# Patient Record
Sex: FEMALE | Race: WHITE | Employment: OTHER | URBAN - METROPOLITAN AREA
[De-identification: names, ages, dates, MRNs, and addresses within clinical notes are randomized per-mention and may not be internally consistent; named-entity substitution may affect disease eponyms.]

---

## 2017-02-09 ENCOUNTER — NURSE ONLY (OUTPATIENT)
Dept: CARDIAC REHAB | Facility: HOSPITAL | Age: 67
End: 2017-02-09
Attending: INTERNAL MEDICINE

## 2017-05-02 ENCOUNTER — PRIOR ORIGINAL RECORDS (OUTPATIENT)
Dept: OTHER | Age: 67
End: 2017-05-02

## 2017-06-27 PROBLEM — H40.1233 LOW-TENSION GLAUCOMA OF BOTH EYES, SEVERE STAGE: Status: ACTIVE | Noted: 2017-06-27

## 2017-06-27 PROCEDURE — 86706 HEP B SURFACE ANTIBODY: CPT | Performed by: INTERNAL MEDICINE

## 2017-06-27 PROCEDURE — 82570 ASSAY OF URINE CREATININE: CPT | Performed by: INTERNAL MEDICINE

## 2017-06-27 PROCEDURE — 81003 URINALYSIS AUTO W/O SCOPE: CPT | Performed by: INTERNAL MEDICINE

## 2017-06-27 PROCEDURE — 82043 UR ALBUMIN QUANTITATIVE: CPT | Performed by: INTERNAL MEDICINE

## 2017-11-10 PROCEDURE — 87086 URINE CULTURE/COLONY COUNT: CPT | Performed by: INTERNAL MEDICINE

## 2017-11-28 PROCEDURE — 82607 VITAMIN B-12: CPT | Performed by: INTERNAL MEDICINE

## 2017-11-28 PROCEDURE — 36415 COLL VENOUS BLD VENIPUNCTURE: CPT | Performed by: INTERNAL MEDICINE

## 2017-12-08 PROBLEM — K30 NUD (NONULCER DYSPEPSIA): Status: ACTIVE | Noted: 2017-12-08

## 2018-01-11 ENCOUNTER — HOSPITAL ENCOUNTER (EMERGENCY)
Facility: HOSPITAL | Age: 68
Discharge: HOME | End: 2018-01-11
Attending: EMERGENCY MEDICINE
Payer: MEDICARE

## 2018-01-11 ENCOUNTER — APPOINTMENT (OUTPATIENT)
Dept: GENERAL RADIOLOGY | Facility: HOSPITAL | Age: 68
End: 2018-01-11
Payer: MEDICARE

## 2018-01-11 VITALS
HEART RATE: 85 BPM | SYSTOLIC BLOOD PRESSURE: 137 MMHG | OXYGEN SATURATION: 98 % | TEMPERATURE: 98 F | WEIGHT: 114 LBS | RESPIRATION RATE: 16 BRPM | HEIGHT: 59 IN | BODY MASS INDEX: 22.98 KG/M2 | DIASTOLIC BLOOD PRESSURE: 71 MMHG

## 2018-01-11 DIAGNOSIS — K21.9 GASTROESOPHAGEAL REFLUX DISEASE, ESOPHAGITIS PRESENCE NOT SPECIFIED: ICD-10-CM

## 2018-01-11 DIAGNOSIS — R07.9 ACUTE CHEST PAIN: Primary | ICD-10-CM

## 2018-01-11 LAB
ALBUMIN SERPL-MCNC: 3.8 G/DL (ref 3.5–4.8)
ALP LIVER SERPL-CCNC: 58 U/L (ref 55–142)
ALT SERPL-CCNC: 29 U/L (ref 14–54)
AST SERPL-CCNC: 30 U/L (ref 15–41)
BASOPHILS # BLD AUTO: 0.04 X10(3) UL (ref 0–0.1)
BASOPHILS NFR BLD AUTO: 0.6 %
BILIRUB SERPL-MCNC: 0.5 MG/DL (ref 0.1–2)
BUN BLD-MCNC: 16 MG/DL (ref 8–20)
CALCIUM BLD-MCNC: 9.6 MG/DL (ref 8.3–10.3)
CHLORIDE: 104 MMOL/L (ref 101–111)
CO2: 27 MMOL/L (ref 22–32)
CREAT BLD-MCNC: 0.76 MG/DL (ref 0.55–1.02)
EOSINOPHIL # BLD AUTO: 0.07 X10(3) UL (ref 0–0.3)
EOSINOPHIL NFR BLD AUTO: 1.1 %
ERYTHROCYTE [DISTWIDTH] IN BLOOD BY AUTOMATED COUNT: 13.2 % (ref 11.5–16)
GLUCOSE BLD-MCNC: 126 MG/DL (ref 70–99)
HCT VFR BLD AUTO: 41.2 % (ref 34–50)
HGB BLD-MCNC: 13.9 G/DL (ref 12–16)
IMMATURE GRANULOCYTE COUNT: 0.01 X10(3) UL (ref 0–1)
IMMATURE GRANULOCYTE RATIO %: 0.2 %
LYMPHOCYTES # BLD AUTO: 2.67 X10(3) UL (ref 0.9–4)
LYMPHOCYTES NFR BLD AUTO: 42.4 %
M PROTEIN MFR SERPL ELPH: 7.5 G/DL (ref 6.1–8.3)
MCH RBC QN AUTO: 28.3 PG (ref 27–33.2)
MCHC RBC AUTO-ENTMCNC: 33.7 G/DL (ref 31–37)
MCV RBC AUTO: 83.7 FL (ref 81–100)
MONOCYTES # BLD AUTO: 0.5 X10(3) UL (ref 0.1–0.6)
MONOCYTES NFR BLD AUTO: 7.9 %
NEUTROPHIL ABS PRELIM: 3 X10 (3) UL (ref 1.3–6.7)
NEUTROPHILS # BLD AUTO: 3 X10(3) UL (ref 1.3–6.7)
NEUTROPHILS NFR BLD AUTO: 47.8 %
PLATELET # BLD AUTO: 258 10(3)UL (ref 150–450)
POTASSIUM SERPL-SCNC: 4 MMOL/L (ref 3.6–5.1)
RBC # BLD AUTO: 4.92 X10(6)UL (ref 3.8–5.1)
RED CELL DISTRIBUTION WIDTH-SD: 40.9 FL (ref 35.1–46.3)
SODIUM SERPL-SCNC: 138 MMOL/L (ref 136–144)
TROPONIN: <0.046 NG/ML (ref ?–0.05)
WBC # BLD AUTO: 6.3 X10(3) UL (ref 4–13)

## 2018-01-11 PROCEDURE — 84484 ASSAY OF TROPONIN QUANT: CPT | Performed by: EMERGENCY MEDICINE

## 2018-01-11 PROCEDURE — 93010 ELECTROCARDIOGRAM REPORT: CPT

## 2018-01-11 PROCEDURE — 85025 COMPLETE CBC W/AUTO DIFF WBC: CPT | Performed by: EMERGENCY MEDICINE

## 2018-01-11 PROCEDURE — 99285 EMERGENCY DEPT VISIT HI MDM: CPT

## 2018-01-11 PROCEDURE — 80053 COMPREHEN METABOLIC PANEL: CPT | Performed by: EMERGENCY MEDICINE

## 2018-01-11 PROCEDURE — 80053 COMPREHEN METABOLIC PANEL: CPT

## 2018-01-11 PROCEDURE — 85025 COMPLETE CBC W/AUTO DIFF WBC: CPT

## 2018-01-11 PROCEDURE — 84484 ASSAY OF TROPONIN QUANT: CPT

## 2018-01-11 PROCEDURE — 71045 X-RAY EXAM CHEST 1 VIEW: CPT

## 2018-01-11 PROCEDURE — 36415 COLL VENOUS BLD VENIPUNCTURE: CPT

## 2018-01-11 PROCEDURE — 93005 ELECTROCARDIOGRAM TRACING: CPT

## 2018-01-11 NOTE — ED INITIAL ASSESSMENT (HPI)
Patient reports midsternal chest pain this am which then progressed to left sided chest pain as well as pain between shoulder blades. Reports left side pain feels sharp, midsternal feels more like GERD and back is aching. Denies any cough/congestion/sob.  D

## 2018-01-12 LAB
ATRIAL RATE: 85 BPM
P AXIS: 77 DEGREES
P-R INTERVAL: 132 MS
Q-T INTERVAL: 384 MS
QRS DURATION: 80 MS
QTC CALCULATION (BEZET): 456 MS
R AXIS: 0 DEGREES
T AXIS: 41 DEGREES
VENTRICULAR RATE: 85 BPM

## 2018-01-12 NOTE — ED PROVIDER NOTES
Patient Seen in: [facility] Emergency Department    History   Patient presents with:  Chest Pain Angina (cardiovascular)    Stated Complaint: chest pain    HPI    This is a 47-year-old female with past medical history of asthma, vertigo, hyperlipide adenoma  1/12/10: COLONOSCOPY,DIAGNOSTIC      Comment: rosi, hemorrhoids  2009: D & C      Comment: post men bleeding  3/19-3/21/13: G-ESOPH REFLX TST W/ELECTROD      Comment: 46 Bravo on Dexilant/Zantac shows 0 reflux in                a 48 hour period  No extremity edema. No calf pain or tenderness.       ED Course     Labs Reviewed   COMP METABOLIC PANEL (14) - Abnormal; Notable for the following:        Result Value    Glucose 126 (*)     All other components within normal limits   TROPONIN I - Normal   C established of normal saline. Patient took 300 mg of Zantac which she was due for this evening. Chest x-ray was negative. EKG showed a sinus with no acute changes. Basic labs were obtained. CBC, CMP, troponin were all negative.   Low suspicion for card

## 2018-01-26 ENCOUNTER — PRIOR ORIGINAL RECORDS (OUTPATIENT)
Dept: OTHER | Age: 68
End: 2018-01-26

## 2018-02-01 PROCEDURE — 88305 TISSUE EXAM BY PATHOLOGIST: CPT | Performed by: INTERNAL MEDICINE

## 2018-04-18 ENCOUNTER — HOSPITAL ENCOUNTER (OUTPATIENT)
Dept: GENERAL RADIOLOGY | Facility: HOSPITAL | Age: 68
Discharge: HOME | End: 2018-04-18
Attending: INTERNAL MEDICINE
Payer: MEDICARE

## 2018-04-18 DIAGNOSIS — R13.10 DIFFICULTY IN SWALLOWING: ICD-10-CM

## 2018-04-18 PROCEDURE — 74220 X-RAY XM ESOPHAGUS 1CNTRST: CPT | Performed by: INTERNAL MEDICINE

## 2018-05-01 ENCOUNTER — PRIOR ORIGINAL RECORDS (OUTPATIENT)
Dept: OTHER | Age: 68
End: 2018-05-01

## 2018-05-01 ENCOUNTER — MYAURORA ACCOUNT LINK (OUTPATIENT)
Dept: OTHER | Age: 68
End: 2018-05-01

## 2018-05-18 ENCOUNTER — HOSPITAL ENCOUNTER (OUTPATIENT)
Dept: CV DIAGNOSTICS | Facility: HOSPITAL | Age: 68
Discharge: HOME | End: 2018-05-18
Attending: INTERNAL MEDICINE
Payer: MEDICARE

## 2018-05-18 DIAGNOSIS — R06.00 DYSPNEA: ICD-10-CM

## 2018-05-18 PROCEDURE — 93018 CV STRESS TEST I&R ONLY: CPT | Performed by: INTERNAL MEDICINE

## 2018-05-18 PROCEDURE — 93350 STRESS TTE ONLY: CPT | Performed by: INTERNAL MEDICINE

## 2018-05-18 PROCEDURE — 93017 CV STRESS TEST TRACING ONLY: CPT | Performed by: INTERNAL MEDICINE

## 2018-05-23 ENCOUNTER — PRIOR ORIGINAL RECORDS (OUTPATIENT)
Dept: OTHER | Age: 68
End: 2018-05-23

## 2018-06-12 PROCEDURE — 81001 URINALYSIS AUTO W/SCOPE: CPT | Performed by: INTERNAL MEDICINE

## 2019-01-08 ENCOUNTER — CARDPULM VISIT (OUTPATIENT)
Dept: CARDIAC REHAB | Facility: HOSPITAL | Age: 69
End: 2019-01-08
Attending: INTERNAL MEDICINE

## 2019-02-28 VITALS
WEIGHT: 109 LBS | HEIGHT: 58 IN | DIASTOLIC BLOOD PRESSURE: 62 MMHG | SYSTOLIC BLOOD PRESSURE: 110 MMHG | HEART RATE: 69 BPM | BODY MASS INDEX: 22.88 KG/M2

## 2019-03-01 VITALS
BODY MASS INDEX: 23.72 KG/M2 | HEART RATE: 64 BPM | WEIGHT: 113 LBS | DIASTOLIC BLOOD PRESSURE: 70 MMHG | HEIGHT: 58 IN | SYSTOLIC BLOOD PRESSURE: 118 MMHG

## 2019-04-02 RX ORDER — RANITIDINE 300 MG/1
TABLET ORAL
COMMUNITY
Start: 2009-11-24 | End: 2020-06-23 | Stop reason: CLARIF

## 2019-04-02 RX ORDER — ATORVASTATIN CALCIUM 10 MG/1
TABLET, FILM COATED ORAL
COMMUNITY
Start: 2009-06-04 | End: 2019-06-18 | Stop reason: SDUPTHER

## 2019-04-02 RX ORDER — LATANOPROST 50 UG/ML
SOLUTION/ DROPS OPHTHALMIC
COMMUNITY

## 2019-05-07 PROBLEM — J45.21 MILD INTERMITTENT ASTHMA WITH ACUTE EXACERBATION (HCC): Status: ACTIVE | Noted: 2019-05-07

## 2019-05-07 PROBLEM — J45.21 MILD INTERMITTENT ASTHMA WITH ACUTE EXACERBATION: Status: ACTIVE | Noted: 2019-05-07

## 2019-05-24 PROCEDURE — 87088 URINE BACTERIA CULTURE: CPT | Performed by: INTERNAL MEDICINE

## 2019-05-24 PROCEDURE — 87186 SC STD MICRODIL/AGAR DIL: CPT | Performed by: INTERNAL MEDICINE

## 2019-05-24 PROCEDURE — 87086 URINE CULTURE/COLONY COUNT: CPT | Performed by: INTERNAL MEDICINE

## 2019-06-18 ENCOUNTER — OFFICE VISIT (OUTPATIENT)
Dept: CARDIOLOGY | Age: 69
End: 2019-06-18

## 2019-06-18 VITALS
HEIGHT: 59 IN | BODY MASS INDEX: 21.57 KG/M2 | DIASTOLIC BLOOD PRESSURE: 50 MMHG | HEART RATE: 64 BPM | SYSTOLIC BLOOD PRESSURE: 90 MMHG | WEIGHT: 107 LBS

## 2019-06-18 DIAGNOSIS — R00.2 PALPITATIONS: Primary | ICD-10-CM

## 2019-06-18 DIAGNOSIS — E78.00 PURE HYPERCHOLESTEROLEMIA: ICD-10-CM

## 2019-06-18 PROCEDURE — 81003 URINALYSIS AUTO W/O SCOPE: CPT | Performed by: INTERNAL MEDICINE

## 2019-06-18 PROCEDURE — 99214 OFFICE O/P EST MOD 30 MIN: CPT | Performed by: INTERNAL MEDICINE

## 2019-06-18 RX ORDER — ATORVASTATIN CALCIUM 10 MG/1
10 TABLET, FILM COATED ORAL DAILY
Qty: 90 TABLET | Refills: 3 | Status: SHIPPED | OUTPATIENT
Start: 2019-06-18 | End: 2020-07-01

## 2019-06-18 RX ORDER — FLUTICASONE PROPIONATE 50 MCG
SPRAY, SUSPENSION (ML) NASAL
COMMUNITY

## 2019-07-03 PROCEDURE — 81003 URINALYSIS AUTO W/O SCOPE: CPT | Performed by: INTERNAL MEDICINE

## 2019-11-30 ENCOUNTER — APPOINTMENT (OUTPATIENT)
Dept: MRI IMAGING | Facility: HOSPITAL | Age: 69
End: 2019-11-30
Attending: EMERGENCY MEDICINE
Payer: MEDICARE

## 2019-11-30 ENCOUNTER — HOSPITAL ENCOUNTER (EMERGENCY)
Facility: HOSPITAL | Age: 69
Discharge: HOME | End: 2019-12-01
Attending: EMERGENCY MEDICINE
Payer: MEDICARE

## 2019-11-30 DIAGNOSIS — R47.89 WORD FINDING DIFFICULTY: Primary | ICD-10-CM

## 2019-11-30 PROCEDURE — 70546 MR ANGIOGRAPH HEAD W/O&W/DYE: CPT | Performed by: EMERGENCY MEDICINE

## 2019-11-30 PROCEDURE — 85025 COMPLETE CBC W/AUTO DIFF WBC: CPT | Performed by: EMERGENCY MEDICINE

## 2019-11-30 PROCEDURE — 99284 EMERGENCY DEPT VISIT MOD MDM: CPT

## 2019-11-30 PROCEDURE — 80053 COMPREHEN METABOLIC PANEL: CPT | Performed by: EMERGENCY MEDICINE

## 2019-11-30 PROCEDURE — 70549 MR ANGIOGRAPH NECK W/O&W/DYE: CPT | Performed by: EMERGENCY MEDICINE

## 2019-11-30 PROCEDURE — 96374 THER/PROPH/DIAG INJ IV PUSH: CPT

## 2019-11-30 PROCEDURE — 70553 MRI BRAIN STEM W/O & W/DYE: CPT | Performed by: EMERGENCY MEDICINE

## 2019-11-30 PROCEDURE — 99285 EMERGENCY DEPT VISIT HI MDM: CPT

## 2019-11-30 RX ORDER — LORAZEPAM 2 MG/ML
0.5 INJECTION INTRAMUSCULAR ONCE
Status: COMPLETED | OUTPATIENT
Start: 2019-11-30 | End: 2019-11-30

## 2019-12-01 VITALS
RESPIRATION RATE: 17 BRPM | HEIGHT: 59 IN | WEIGHT: 105 LBS | SYSTOLIC BLOOD PRESSURE: 125 MMHG | OXYGEN SATURATION: 95 % | HEART RATE: 80 BPM | DIASTOLIC BLOOD PRESSURE: 75 MMHG | BODY MASS INDEX: 21.17 KG/M2

## 2019-12-01 PROCEDURE — A9575 INJ GADOTERATE MEGLUMI 0.1ML: HCPCS | Performed by: EMERGENCY MEDICINE

## 2019-12-01 NOTE — ED PROVIDER NOTES
Patient Seen in: [facility] Emergency Department      History   Patient presents with:  Headache (neurologic)    Stated Complaint: AMS, no resolved    HPI    64-year-old female who presents here to the emergency department with her  after it W/ELECTROD  3/19-3/21/13    46 Robledo on Dexilant/Zantac shows 0 reflux in a 48 hour period   • HEMORRHOIDECTOMY     • UPPER GI ENDOSCOPY,BIOPSY  6/5/06    wnl, Gastric and SB Bx wnl   • UPPER GI ENDOSCOPY,BIOPSY  1993    gastritis, bx for H. pylori negtive breathing. Cardiac: Regular rate and rhythm. Normal S1 and 2 without murmurs or ectopy appreciated  Abdomen: Soft on examination without tenderness to deep palpation or to percussion. No masses appreciated. Bowel sounds are normoactive.   No CVA tendern will be discharged but she does not appear to have an acute neurologic insult causing symptomology. Patient will be discharged. Suggested that she take an aspirin a day.           Disposition and Plan     Clinical Impression:  Word finding difficulty  (pr

## 2019-12-01 NOTE — ED INITIAL ASSESSMENT (HPI)
Pt presents with instance of not being able to get words out to .  states this lasted approx 10 minutes.

## 2019-12-26 ENCOUNTER — OFFICE VISIT (OUTPATIENT)
Dept: NEUROLOGY | Facility: CLINIC | Age: 69
End: 2019-12-26
Payer: MEDICARE

## 2019-12-26 ENCOUNTER — TELEPHONE (OUTPATIENT)
Dept: NEUROLOGY | Facility: CLINIC | Age: 69
End: 2019-12-26

## 2019-12-26 VITALS
RESPIRATION RATE: 16 BRPM | WEIGHT: 110 LBS | DIASTOLIC BLOOD PRESSURE: 68 MMHG | SYSTOLIC BLOOD PRESSURE: 116 MMHG | HEART RATE: 66 BPM | BODY MASS INDEX: 22 KG/M2

## 2019-12-26 DIAGNOSIS — R20.2 NUMBNESS AND TINGLING IN BOTH HANDS: Primary | ICD-10-CM

## 2019-12-26 DIAGNOSIS — K58.9 IRRITABLE BOWEL SYNDROME, UNSPECIFIED TYPE: Primary | ICD-10-CM

## 2019-12-26 DIAGNOSIS — R47.89 WORD FINDING DIFFICULTY: ICD-10-CM

## 2019-12-26 DIAGNOSIS — R20.0 NUMBNESS AND TINGLING IN BOTH HANDS: Primary | ICD-10-CM

## 2019-12-26 DIAGNOSIS — G45.9 TIA (TRANSIENT ISCHEMIC ATTACK): ICD-10-CM

## 2019-12-26 PROCEDURE — 99205 OFFICE O/P NEW HI 60 MIN: CPT | Performed by: OTHER

## 2019-12-26 PROCEDURE — 1111F DSCHRG MED/CURRENT MED MERGE: CPT | Performed by: OTHER

## 2019-12-26 NOTE — TELEPHONE ENCOUNTER
Spoke with pt. States she has a history of GI conditions. Had history of \"erosions\" and would like to establish care with GI doctor mentioned in reason for call.      States she is transitioning to a new PCP, her old one has been out of the office on leav

## 2019-12-26 NOTE — PROGRESS NOTES
Reva [address]27   Neurology; INITIAL CLINIC VISIT  2019, 9:24 AM     [facility] Patient Status:  No patient class for patient encounter    1950 MRN [id number]   Location [address] [place] [address] Mely Robison bleeding   • ESOPHAGOGASTRODUODENOSCOPY, POSSIBLE DILATION, POSSIBLE BIOPSY, POSSIBLE POLYPECTOMY N/A 2/1/2018    Performed by Duane Saenz MD at [facility]   • G-ESOPH REFLX TST W/ELECTROD  3/19-3/21/13    46 Robledo on Dexilant/Zantac Intramuscular Recon Susp      • famotidine 40 MG Oral Tab Take 1 tablet (40 mg total) by mouth nightly as needed for Heartburn.  90 tablet prn   • ATORVASTATIN 10 MG Oral Tab TAKE ONE TABLET BY MOUTH ONE TIME DAILY  90 tablet 2   • Albuterol Sulfate HFA (CO bleeding  ENDOCRINE: denies excessive thirst or urination; denies unexpected wt gain or wt loss  ALLERGY/IMM.: denies food or seasonal allergies      PHYSICAL EXAMINATION:  VITAL SIGNS: /68   Pulse 66   Resp 16   Wt 110 lb (49.9 kg)   BMI 22.22 kg/m² based,  Romberg sign is negative, Tandem walk is normal        LABS:     Reviewed with patient      IMAGING: reviewed film or imaging with patient. MRI/MRA brain and neck:  CONCLUSION:       1. No acute intracranial abnormality identified.      2. Trace 81 mg qd,   Risk factor of TIA was discussed. Lastly hand numbness, likely CTS, will do EMG of arms for her,       Return in about 2 months (around 2/26/2020). We discussed in depth regarding diagnosis, prognosis, treatment.  Side effect of medicatio

## 2019-12-26 NOTE — PROGRESS NOTES
Denies any difficulty with speaking since leaving the hospital. Patient states she is having tingling and numbness in both hands. This occurs mostly at night. Also holding things for longer periods of time. Denies any changes in her .  Right eye is weak

## 2019-12-27 NOTE — TELEPHONE ENCOUNTER
Left pt a VM that referral for GI doctor was signed and she should be able to make an appt now. Advised her to call our office with any questions.

## 2019-12-30 ENCOUNTER — APPOINTMENT (OUTPATIENT)
Dept: LAB | Age: 69
End: 2019-12-30
Attending: Other
Payer: MEDICARE

## 2019-12-30 DIAGNOSIS — G45.9 TIA (TRANSIENT ISCHEMIC ATTACK): ICD-10-CM

## 2019-12-30 LAB
CHOLEST SMN-MCNC: 158 MG/DL (ref ?–200)
HDLC SERPL-MCNC: 81 MG/DL (ref 40–59)
LDLC SERPL CALC-MCNC: 70 MG/DL (ref ?–100)
NONHDLC SERPL-MCNC: 77 MG/DL (ref ?–130)
PATIENT FASTING Y/N/NP: YES
TRIGL SERPL-MCNC: 34 MG/DL (ref 30–149)
VLDLC SERPL CALC-MCNC: 7 MG/DL (ref 0–30)

## 2019-12-30 PROCEDURE — 80061 LIPID PANEL: CPT

## 2019-12-30 PROCEDURE — 36415 COLL VENOUS BLD VENIPUNCTURE: CPT

## 2020-01-09 ENCOUNTER — TELEPHONE (OUTPATIENT)
Dept: NEUROLOGY | Facility: CLINIC | Age: 70
End: 2020-01-09

## 2020-01-09 ENCOUNTER — NURSE ONLY (OUTPATIENT)
Dept: ELECTROPHYSIOLOGY | Facility: HOSPITAL | Age: 70
End: 2020-01-09
Attending: Other
Payer: MEDICARE

## 2020-01-09 DIAGNOSIS — R47.89 WORD FINDING DIFFICULTY: ICD-10-CM

## 2020-01-09 DIAGNOSIS — G45.9 TIA (TRANSIENT ISCHEMIC ATTACK): Primary | ICD-10-CM

## 2020-01-09 PROCEDURE — 95819 EEG AWAKE AND ASLEEP: CPT | Performed by: OTHER

## 2020-01-09 NOTE — TELEPHONE ENCOUNTER
Patient notified of EEG results and verbalized understanding.      ----- Message from Padma Massey MD sent at 1/9/2020 12:33 PM CST -----  EEG is normal

## 2020-01-09 NOTE — PROCEDURES
[facility], [address] [place] [address]      PATIENT'S NAME: Ajits Itzel   ATTENDING PHYSICIAN: Venice Giraldo M.D.    PATIENT ACCOUNT #: [de-identified] LOCATION: [facility]   MEDICAL RECORD #: [id number] YOB: 1950

## 2020-01-10 ENCOUNTER — HOSPITAL ENCOUNTER (OUTPATIENT)
Dept: CV DIAGNOSTICS | Facility: HOSPITAL | Age: 70
Discharge: HOME | End: 2020-01-10
Attending: Other
Payer: MEDICARE

## 2020-01-10 DIAGNOSIS — G45.9 TIA (TRANSIENT ISCHEMIC ATTACK): ICD-10-CM

## 2020-01-10 PROCEDURE — 93306 TTE W/DOPPLER COMPLETE: CPT | Performed by: OTHER

## 2020-01-13 ENCOUNTER — TELEPHONE (OUTPATIENT)
Dept: NEUROLOGY | Facility: CLINIC | Age: 70
End: 2020-01-13

## 2020-01-17 ENCOUNTER — OFFICE VISIT (OUTPATIENT)
Dept: INTERNAL MEDICINE CLINIC | Facility: CLINIC | Age: 70
End: 2020-01-17
Payer: MEDICARE

## 2020-01-17 VITALS
OXYGEN SATURATION: 96 % | DIASTOLIC BLOOD PRESSURE: 74 MMHG | SYSTOLIC BLOOD PRESSURE: 122 MMHG | BODY MASS INDEX: 22.71 KG/M2 | HEIGHT: 58 IN | RESPIRATION RATE: 16 BRPM | WEIGHT: 108.19 LBS | HEART RATE: 76 BPM

## 2020-01-17 DIAGNOSIS — E78.2 MIXED HYPERLIPIDEMIA: ICD-10-CM

## 2020-01-17 DIAGNOSIS — J45.20 MILD INTERMITTENT ASTHMA IN ADULT WITHOUT COMPLICATION: ICD-10-CM

## 2020-01-17 DIAGNOSIS — G45.9 TIA (TRANSIENT ISCHEMIC ATTACK): Primary | ICD-10-CM

## 2020-01-17 DIAGNOSIS — K21.9 GASTROESOPHAGEAL REFLUX DISEASE, ESOPHAGITIS PRESENCE NOT SPECIFIED: ICD-10-CM

## 2020-01-17 DIAGNOSIS — R73.9 BLOOD GLUCOSE ELEVATED: ICD-10-CM

## 2020-01-17 LAB
CARTRIDGE LOT#: 588 NUMERIC
HEMOGLOBIN A1C: 5.9 % (ref 4.3–5.6)

## 2020-01-17 PROCEDURE — 99203 OFFICE O/P NEW LOW 30 MIN: CPT | Performed by: INTERNAL MEDICINE

## 2020-01-17 PROCEDURE — 83036 HEMOGLOBIN GLYCOSYLATED A1C: CPT | Performed by: INTERNAL MEDICINE

## 2020-01-17 RX ORDER — NICOTINE POLACRILEX 4 MG/1
GUM, CHEWING ORAL
Qty: 30 TABLET | Refills: 11 | COMMUNITY
Start: 2020-01-17 | End: 2020-02-13

## 2020-01-17 NOTE — PROGRESS NOTES
Antoni Lamar is a 68year old female.   Patient presents with:  New Patient: cn room 3: new patient here to establish care, h/o TIA, gerd, asthma/allergies, predm      HPI:     Patient with h/o TIA Nov 2019 with episode of loss of speech, HL, gerd, stomach Nasal Suspension by Nasal route as needed. • Montelukast Sodium 5 MG Oral Chew Tab Chew 5 mg by mouth as needed. • famotidine 40 MG Oral Tab Take 1 tablet (40 mg total) by mouth nightly as needed for Heartburn.  90 tablet prn   • Olopatadine HCl Pulmonary Disease Father    • Renal Disease Father    • Hypertension Father    • Kidney Disease Father    • High Cholesterol Father    • Lung Disorder Father    • Heart Surgery Father    • Pacemaker Father    • Prostate Cancer Father    • Other (Other) Fat reviewed and agree with ACT and AAP  Mixed hyperlipidemia- continue atorvastatin, rpt lipids in June  Blood glucose elevated, mild glucose intolerance- watch diet, hgba1c today    Orders Placed This Encounter      Comp Metabolic Panel (14)      Lipid Panel

## 2020-01-30 ENCOUNTER — TELEPHONE (OUTPATIENT)
Dept: NEUROLOGY | Facility: CLINIC | Age: 70
End: 2020-01-30

## 2020-01-30 ENCOUNTER — OFFICE VISIT (OUTPATIENT)
Dept: NEUROLOGY | Facility: CLINIC | Age: 70
End: 2020-01-30
Payer: MEDICARE

## 2020-01-30 VITALS
DIASTOLIC BLOOD PRESSURE: 62 MMHG | SYSTOLIC BLOOD PRESSURE: 108 MMHG | BODY MASS INDEX: 22 KG/M2 | WEIGHT: 107 LBS | RESPIRATION RATE: 16 BRPM | HEART RATE: 70 BPM

## 2020-01-30 DIAGNOSIS — G45.9 TIA (TRANSIENT ISCHEMIC ATTACK): ICD-10-CM

## 2020-01-30 DIAGNOSIS — R20.2 NUMBNESS AND TINGLING IN BOTH HANDS: ICD-10-CM

## 2020-01-30 DIAGNOSIS — R47.89 WORD FINDING DIFFICULTY: Primary | ICD-10-CM

## 2020-01-30 DIAGNOSIS — R20.0 NUMBNESS AND TINGLING IN BOTH HANDS: ICD-10-CM

## 2020-01-30 PROCEDURE — 99214 OFFICE O/P EST MOD 30 MIN: CPT | Performed by: OTHER

## 2020-01-30 RX ORDER — CLOPIDOGREL BISULFATE 75 MG/1
75 TABLET ORAL DAILY
Qty: 30 TABLET | Refills: 11 | Status: SHIPPED | OUTPATIENT
Start: 2020-01-30 | End: 2020-05-06

## 2020-01-30 NOTE — TELEPHONE ENCOUNTER
Pt was on aspirin after TIA but could not tolerate it because of stomach issues. She is concerned she may have gastric bleeding and wonders if she should wait a few weeks to start Plavix.      I explained to pt that generally with gastric bleeding there wou

## 2020-01-30 NOTE — PROGRESS NOTES
Reva [address]27   Neurology; follow up CLINIC VISIT  2020    Gabriela Garvin Patient Status:  No patient class for patient encounter    1950 MRN [id number]   Location [address] [place] [address], [address] [place] [address] PCP Tin 11/13/2007       PAST SURGICAL HISTORY:  Past Surgical History:   Procedure Laterality Date   • COLONOSCOPY N/A 8/22/2016    Performed by Wai Gonzalez MD at [facility] ENDOSCOPY   • COLONOSCOPY,BIOPSY  2005    adenoma   • COLONOSCOPY,DIAGNOSTIC  1/12/10 does not drink alcohol or use drugs. ALLERGIES:    Isovue [Isovue-M]       OTHER (SEE COMMENTS)    Comment:INC.  HR AND BP  Norpramine [Desipra*    RASH    MEDICATIONS:  Current Outpatient Medications   Medication Sig Dispense Refill   • Clopidogrel Bisu dysuria, urgency or frequency; no urinary incontinence  MUSCULOSKELETAL: no joint complaints in  upper or lower extremities  NEURO: see HPI;  PSYCHE: no symptoms of depression or anxiety  HEMATOLOGY:  denies bruising or excessive bleeding  ENDOCRINE: denie median distribution, normal  temperature, vibration and proprioception;  DTRs; Symmetric in both arms and legs, including biceps, triceps, knees, ankles,  Babinski sign is negative;   Coordination: Normal FTN and HTS;   Gait: Normal based,  Romberg sign is 12/01/2019 at 8:06         EEG  IMPRESSION:  This is a normal study for patient's age. There is no epileptiform activity that was identified during this recording.   Clinical correlation is indicated.         Dictated By Juan José Peralta M.D.  d:     01/09/2020 1

## 2020-01-30 NOTE — PROGRESS NOTES
Pt following up for TIA. Pt was going to take aspirin but pt stopped because it was hurting her stomach. Pt reports no new symptoms.

## 2020-01-30 NOTE — TELEPHONE ENCOUNTER
Spoke with pt, relayed note below, answered pt questions. Advised pt to jai GI if she has GI specific questions, also requested pt call this office with further questions or concerns.   Pt expressed understanding and was encouraged to call office with adriana

## 2020-01-31 ENCOUNTER — PATIENT MESSAGE (OUTPATIENT)
Dept: INTERNAL MEDICINE CLINIC | Facility: CLINIC | Age: 70
End: 2020-01-31

## 2020-01-31 NOTE — TELEPHONE ENCOUNTER
From: Judith Garvin  To: Bobbi Mathews MD  Sent: 1/31/2020 2:40 PM CST  Subject: Non-Urgent Medical Question    Hi.    During our last visit we discussed nexium and a buffered baby aspirin to prevent another tia and avoid stomach irritation ( due to prior

## 2020-02-01 NOTE — TELEPHONE ENCOUNTER
There is no clotting blood test to see if plavix 75mg is the right dose. That is the typical and only dosage I see being used for cases like hers. She can always enquire further with Dr. Troy Lenz if she likes.

## 2020-02-10 PROBLEM — O24.419 GESTATIONAL DIABETES MELLITUS (HCC): Status: ACTIVE | Noted: 2018-02-16

## 2020-02-10 PROBLEM — H25.13 AGE-RELATED NUCLEAR CATARACT OF BOTH EYES: Status: ACTIVE | Noted: 2018-06-29

## 2020-02-27 ENCOUNTER — OFFICE VISIT (OUTPATIENT)
Dept: ELECTROPHYSIOLOGY | Facility: HOSPITAL | Age: 70
End: 2020-02-27
Attending: Other
Payer: MEDICARE

## 2020-02-27 DIAGNOSIS — R20.2 NUMBNESS AND TINGLING IN BOTH HANDS: ICD-10-CM

## 2020-02-27 DIAGNOSIS — R20.0 NUMBNESS AND TINGLING IN BOTH HANDS: ICD-10-CM

## 2020-02-27 PROCEDURE — 95886 MUSC TEST DONE W/N TEST COMP: CPT | Performed by: OTHER

## 2020-02-27 PROCEDURE — 95910 NRV CNDJ TEST 7-8 STUDIES: CPT | Performed by: OTHER

## 2020-02-28 NOTE — PROCEDURES
[facility], [address] [place] [address]      PATIENT'S NAME: Hankbenita Fields   REFERRING PHYSICIAN: Jackie Johnson M.D.    PATIENT ACCOUNT #: [de-identified] LOCATION: [facility]   MEDICAL RECORD #: [id number] YOB: 1950

## 2020-03-04 ENCOUNTER — TELEPHONE (OUTPATIENT)
Dept: NEUROLOGY | Facility: CLINIC | Age: 70
End: 2020-03-04

## 2020-03-04 NOTE — TELEPHONE ENCOUNTER
S: Stomach pain    B: LOV 1/30/20     Clinically, This  patient presented with transient speech difficulty five minutes, likely TIA  Neurological Exam today is normal     Work up included echo and lipid penal again, normal     Symptomatic treatment include

## 2020-03-06 ENCOUNTER — TELEPHONE (OUTPATIENT)
Dept: CARDIOLOGY | Age: 70
End: 2020-03-06

## 2020-03-06 NOTE — TELEPHONE ENCOUNTER
Informed pt of Dr Sofy Cleveland notes and again reiterated the risks of not taking the medication. Pt Verbalized understanding, no further questions.

## 2020-03-06 NOTE — TELEPHONE ENCOUNTER
Pt spoke with Leslie chambers and he doesn't have any other ideas. Pt would like to stop plavix for two weeks and see if that is what is causing her GI upset. Informed pt of risk of TIA if she were to stop plavix.  Pt understands but cant handle the GI

## 2020-03-09 ENCOUNTER — TELEPHONE (OUTPATIENT)
Dept: NEUROLOGY | Facility: CLINIC | Age: 70
End: 2020-03-09

## 2020-03-09 NOTE — TELEPHONE ENCOUNTER
Spoke with pt, she states that 15 days after starting Plavix she began to have GI upset that increased in severity until Friday 03/06/2020, pt states on 03/06/2020 she experienced burning pain in the esophagus, nausea, and burning pain in the esophagus.  Pt

## 2020-06-12 ENCOUNTER — TELEPHONE (OUTPATIENT)
Dept: CARDIOLOGY | Age: 70
End: 2020-06-12

## 2020-06-23 ENCOUNTER — OFFICE VISIT (OUTPATIENT)
Dept: CARDIOLOGY | Age: 70
End: 2020-06-23

## 2020-06-23 VITALS
DIASTOLIC BLOOD PRESSURE: 73 MMHG | HEIGHT: 59 IN | HEART RATE: 66 BPM | WEIGHT: 100 LBS | BODY MASS INDEX: 20.16 KG/M2 | SYSTOLIC BLOOD PRESSURE: 115 MMHG

## 2020-06-23 DIAGNOSIS — R00.2 PALPITATIONS: ICD-10-CM

## 2020-06-23 DIAGNOSIS — E78.00 PURE HYPERCHOLESTEROLEMIA: Primary | ICD-10-CM

## 2020-06-23 PROCEDURE — 99442 TELEPHONE E&M BY PHYSICIAN EST PT NOT ORIG PREV 7 DAYS 11-20 MIN: CPT | Performed by: INTERNAL MEDICINE

## 2020-06-23 RX ORDER — LEVALBUTEROL TARTRATE 45 UG/1
2 AEROSOL, METERED ORAL
COMMUNITY
Start: 2019-07-08

## 2020-06-23 RX ORDER — FAMOTIDINE 40 MG/1
TABLET, FILM COATED ORAL
COMMUNITY
Start: 2019-10-14

## 2020-06-23 RX ORDER — BRIMONIDINE TARTRATE 1.5 MG/ML
SOLUTION/ DROPS OPHTHALMIC
COMMUNITY
Start: 2015-10-01

## 2020-06-23 RX ORDER — POLYETHYLENE GLYCOL-3350 AND ELECTROLYTES WITH FLAVOR PACK 240; 5.84; 2.98; 6.72; 22.72 G/278.26G; G/278.26G; G/278.26G; G/278.26G; G/278.26G
POWDER, FOR SOLUTION ORAL
COMMUNITY
Start: 2020-06-19

## 2020-06-23 SDOH — HEALTH STABILITY: PHYSICAL HEALTH: ON AVERAGE, HOW MANY MINUTES DO YOU ENGAGE IN EXERCISE AT THIS LEVEL?: 60 MIN

## 2020-06-23 SDOH — SOCIAL STABILITY: SOCIAL NETWORK: ARE YOU MARRIED, WIDOWED, DIVORCED, SEPARATED, NEVER MARRIED, OR LIVING WITH A PARTNER?: MARRIED

## 2020-06-23 SDOH — HEALTH STABILITY: PHYSICAL HEALTH: ON AVERAGE, HOW MANY DAYS PER WEEK DO YOU ENGAGE IN MODERATE TO STRENUOUS EXERCISE (LIKE A BRISK WALK)?: 7 DAYS

## 2020-06-23 SDOH — HEALTH STABILITY: MENTAL HEALTH: HOW OFTEN DO YOU HAVE A DRINK CONTAINING ALCOHOL?: NOT ASKED

## 2020-06-23 ASSESSMENT — PATIENT HEALTH QUESTIONNAIRE - PHQ9
1. LITTLE INTEREST OR PLEASURE IN DOING THINGS: NOT AT ALL
SUM OF ALL RESPONSES TO PHQ9 QUESTIONS 1 AND 2: 0
CLINICAL INTERPRETATION OF PHQ2 SCORE: NO FURTHER SCREENING NEEDED
CLINICAL INTERPRETATION OF PHQ9 SCORE: NO FURTHER SCREENING NEEDED
2. FEELING DOWN, DEPRESSED OR HOPELESS: NOT AT ALL
SUM OF ALL RESPONSES TO PHQ9 QUESTIONS 1 AND 2: 0

## 2020-06-28 ENCOUNTER — TELEPHONE (OUTPATIENT)
Dept: CARDIOLOGY | Age: 70
End: 2020-06-28

## 2020-06-28 DIAGNOSIS — E78.00 PURE HYPERCHOLESTEROLEMIA: Primary | ICD-10-CM

## 2020-07-01 RX ORDER — ATORVASTATIN CALCIUM 10 MG/1
TABLET, FILM COATED ORAL
Qty: 90 TABLET | Refills: 0 | Status: SHIPPED | OUTPATIENT
Start: 2020-07-01 | End: 2020-07-01 | Stop reason: CLARIF

## 2020-07-01 RX ORDER — ATORVASTATIN CALCIUM 10 MG/1
10 TABLET, FILM COATED ORAL DAILY
Qty: 90 TABLET | Refills: 1 | Status: SHIPPED | OUTPATIENT
Start: 2020-07-01

## 2020-07-01 RX ORDER — ATORVASTATIN CALCIUM 10 MG/1
10 TABLET, FILM COATED ORAL DAILY
COMMUNITY
End: 2020-07-01 | Stop reason: SDUPTHER

## 2020-07-10 ENCOUNTER — TELEPHONE (OUTPATIENT)
Dept: CARDIOLOGY | Age: 70
End: 2020-07-10

## 2020-07-10 PROCEDURE — 88305 TISSUE EXAM BY PATHOLOGIST: CPT | Performed by: INTERNAL MEDICINE

## 2020-07-15 PROBLEM — J90 PLEURAL EFFUSION ON RIGHT: Status: ACTIVE | Noted: 2020-07-15

## 2020-07-15 PROBLEM — I31.39 PERICARDIAL EFFUSION: Status: ACTIVE | Noted: 2020-07-15

## 2020-07-15 PROBLEM — I31.3 PERICARDIAL EFFUSION: Status: ACTIVE | Noted: 2020-07-15

## 2020-07-15 PROBLEM — I31.39 PERICARDIAL EFFUSION (HCC): Status: ACTIVE | Noted: 2020-07-15

## 2020-07-28 ENCOUNTER — TELEMEDICINE (OUTPATIENT)
Dept: NEUROLOGY | Facility: CLINIC | Age: 70
End: 2020-07-28
Payer: MEDICARE

## 2020-07-28 DIAGNOSIS — G45.9 TIA (TRANSIENT ISCHEMIC ATTACK): ICD-10-CM

## 2020-07-28 DIAGNOSIS — R20.0 NUMBNESS AND TINGLING IN BOTH HANDS: Primary | ICD-10-CM

## 2020-07-28 DIAGNOSIS — R47.89 WORD FINDING DIFFICULTY: ICD-10-CM

## 2020-07-28 DIAGNOSIS — R20.2 NUMBNESS AND TINGLING IN BOTH HANDS: Primary | ICD-10-CM

## 2020-07-28 PROCEDURE — 99214 OFFICE O/P EST MOD 30 MIN: CPT | Performed by: OTHER

## 2020-07-28 RX ORDER — ASPIRIN AND DIPYRIDAMOLE 25; 200 MG/1; MG/1
1 CAPSULE, EXTENDED RELEASE ORAL 2 TIMES DAILY
Qty: 60 CAPSULE | Refills: 11 | Status: SHIPPED | OUTPATIENT
Start: 2020-07-28 | End: 2021-03-22

## 2020-07-28 NOTE — PROGRESS NOTES
This follow up is conducted as a video visit after consent was obtained from patient and patient' POA      HISTORY OF PRESENT ILLNESS:  Deniz Gonzáles is a(n) 68year old, right handed,  female who presents for TIA follow up      Current symptoms and major able to take ASA or Plavix due to stomach side effect,   I suggested her to try aggrenox one bid, entered script for her. CTS, confirmed by EMG, she declines to do any procedure.  She wants to take care of her medical problems now,   Wrist splints only,

## 2021-02-15 ENCOUNTER — TELEPHONE (OUTPATIENT)
Dept: INTERNAL MEDICINE CLINIC | Facility: CLINIC | Age: 71
End: 2021-02-15

## 2021-02-15 NOTE — TELEPHONE ENCOUNTER
Pt called back to state that Dr. Omar Mccloud is back seeing patients and she will continue to see him until he retires. Once that happens she would love to have TB be her PCP.

## 2021-03-15 DIAGNOSIS — Z23 NEED FOR VACCINATION: ICD-10-CM

## 2021-06-03 PROCEDURE — 88305 TISSUE EXAM BY PATHOLOGIST: CPT | Performed by: INTERNAL MEDICINE

## 2021-06-03 PROCEDURE — 88312 SPECIAL STAINS GROUP 1: CPT | Performed by: INTERNAL MEDICINE

## 2021-06-29 ENCOUNTER — APPOINTMENT (OUTPATIENT)
Dept: CARDIOLOGY | Age: 71
End: 2021-06-29

## 2021-07-04 NOTE — LETTER
07/01/20        [address] [facility] [id number]      Dear Micky Rodriguez,    Our records indicate that you have outstanding lab work and or testing that was ordered for you and has not yet been completed:  Orders Placed This [employer] 5th grade

## 2022-06-20 ENCOUNTER — EMPLOYEE HEALTH (OUTPATIENT)
Dept: OTHER | Facility: HOSPITAL | Age: 72
End: 2022-06-20
Attending: PREVENTIVE MEDICINE

## 2022-06-20 DIAGNOSIS — Z01.84 IMMUNITY STATUS TESTING: ICD-10-CM

## 2022-06-20 DIAGNOSIS — Z11.1 SCREENING-PULMONARY TB: Primary | ICD-10-CM

## 2022-06-20 LAB
RUBV IGG SER QL: POSITIVE
RUBV IGG SER-ACNC: 448.9 IU/ML (ref 10–?)

## 2022-06-20 PROCEDURE — 86480 TB TEST CELL IMMUN MEASURE: CPT

## 2022-06-20 PROCEDURE — 86765 RUBEOLA ANTIBODY: CPT

## 2022-06-20 PROCEDURE — 86762 RUBELLA ANTIBODY: CPT

## 2022-06-20 PROCEDURE — 86787 VARICELLA-ZOSTER ANTIBODY: CPT

## 2022-06-20 PROCEDURE — 86735 MUMPS ANTIBODY: CPT

## 2022-06-21 LAB
M TB IFN-G CD4+ T-CELLS BLD-ACNC: 0.03 IU/ML
M TB TUBERC IFN-G BLD QL: NEGATIVE
M TB TUBERC IGNF/MITOGEN IGNF CONTROL: >10 IU/ML
QFT TB1 AG MINUS NIL: 0 IU/ML
QFT TB2 AG MINUS NIL: -0.01 IU/ML

## 2022-06-22 LAB
MEV IGG SER-ACNC: >300 AU/ML (ref 16.5–?)
MUV IGG SER IA-ACNC: 43.2 AU/ML (ref 11–?)
VZV IGG SER IA-ACNC: 3405 (ref 165–?)

## 2022-07-30 ENCOUNTER — HOSPITAL ENCOUNTER (OUTPATIENT)
Facility: HOSPITAL | Age: 72
Setting detail: OBSERVATION
Discharge: HOME | End: 2022-08-01
Attending: EMERGENCY MEDICINE | Admitting: INTERNAL MEDICINE
Payer: MEDICARE

## 2022-07-30 DIAGNOSIS — R07.9 ACUTE CHEST PAIN: Primary | ICD-10-CM

## 2022-07-30 DIAGNOSIS — R94.31 ABNORMAL EKG: ICD-10-CM

## 2022-07-30 PROCEDURE — 85025 COMPLETE CBC W/AUTO DIFF WBC: CPT | Performed by: EMERGENCY MEDICINE

## 2022-07-30 PROCEDURE — 84484 ASSAY OF TROPONIN QUANT: CPT | Performed by: EMERGENCY MEDICINE

## 2022-07-30 PROCEDURE — 36415 COLL VENOUS BLD VENIPUNCTURE: CPT

## 2022-07-30 PROCEDURE — 99285 EMERGENCY DEPT VISIT HI MDM: CPT

## 2022-07-30 PROCEDURE — 80053 COMPREHEN METABOLIC PANEL: CPT | Performed by: EMERGENCY MEDICINE

## 2022-07-30 PROCEDURE — 93010 ELECTROCARDIOGRAM REPORT: CPT

## 2022-07-30 PROCEDURE — 93005 ELECTROCARDIOGRAM TRACING: CPT

## 2022-07-31 ENCOUNTER — APPOINTMENT (OUTPATIENT)
Dept: CV DIAGNOSTICS | Facility: HOSPITAL | Age: 72
End: 2022-07-31
Attending: CLINICAL NURSE SPECIALIST
Payer: MEDICARE

## 2022-07-31 ENCOUNTER — APPOINTMENT (OUTPATIENT)
Dept: GENERAL RADIOLOGY | Facility: HOSPITAL | Age: 72
End: 2022-07-31
Attending: EMERGENCY MEDICINE
Payer: MEDICARE

## 2022-07-31 PROBLEM — R07.9 ACUTE CHEST PAIN: Status: ACTIVE | Noted: 2022-07-31

## 2022-07-31 PROBLEM — R73.9 HYPERGLYCEMIA: Status: ACTIVE | Noted: 2022-07-31

## 2022-07-31 PROBLEM — E87.1 HYPONATREMIA: Status: ACTIVE | Noted: 2022-07-31

## 2022-07-31 PROBLEM — R94.31 ABNORMAL EKG: Status: ACTIVE | Noted: 2022-07-31

## 2022-07-31 PROBLEM — R79.89 AZOTEMIA: Status: ACTIVE | Noted: 2022-07-31

## 2022-07-31 LAB
ALBUMIN SERPL-MCNC: 3.5 G/DL (ref 3.4–5)
ALBUMIN/GLOB SERPL: 1.2 {RATIO} (ref 1–2)
ALP LIVER SERPL-CCNC: 40 U/L
ALT SERPL-CCNC: 23 U/L
ANION GAP SERPL CALC-SCNC: 9 MMOL/L (ref 0–18)
APTT PPP: 25.6 SECONDS (ref 23.3–35.6)
AST SERPL-CCNC: 23 U/L (ref 15–37)
ATRIAL RATE: 84 BPM
BASOPHILS # BLD AUTO: 0.04 X10(3) UL (ref 0–0.2)
BASOPHILS NFR BLD AUTO: 0.7 %
BILIRUB SERPL-MCNC: 0.2 MG/DL (ref 0.1–2)
BUN BLD-MCNC: 20 MG/DL (ref 7–18)
CALCIUM BLD-MCNC: 8.9 MG/DL (ref 8.5–10.1)
CHLORIDE SERPL-SCNC: 100 MMOL/L (ref 98–112)
CO2 SERPL-SCNC: 26 MMOL/L (ref 21–32)
CREAT BLD-MCNC: 0.61 MG/DL
EOSINOPHIL # BLD AUTO: 0.11 X10(3) UL (ref 0–0.7)
EOSINOPHIL NFR BLD AUTO: 1.8 %
ERYTHROCYTE [DISTWIDTH] IN BLOOD BY AUTOMATED COUNT: 13.1 %
GLOBULIN PLAS-MCNC: 2.9 G/DL (ref 2.8–4.4)
GLUCOSE BLD-MCNC: 118 MG/DL (ref 70–99)
HCT VFR BLD AUTO: 36.6 %
HGB BLD-MCNC: 12.1 G/DL
IMM GRANULOCYTES # BLD AUTO: 0.01 X10(3) UL (ref 0–1)
IMM GRANULOCYTES NFR BLD: 0.2 %
INR BLD: 0.98 (ref 0.85–1.16)
LYMPHOCYTES # BLD AUTO: 2.98 X10(3) UL (ref 1–4)
LYMPHOCYTES NFR BLD AUTO: 48.5 %
MCH RBC QN AUTO: 28.4 PG (ref 26–34)
MCHC RBC AUTO-ENTMCNC: 33.1 G/DL (ref 31–37)
MCV RBC AUTO: 85.9 FL
MONOCYTES # BLD AUTO: 0.44 X10(3) UL (ref 0.1–1)
MONOCYTES NFR BLD AUTO: 7.2 %
NEUTROPHILS # BLD AUTO: 2.56 X10 (3) UL (ref 1.5–7.7)
NEUTROPHILS # BLD AUTO: 2.56 X10(3) UL (ref 1.5–7.7)
NEUTROPHILS NFR BLD AUTO: 41.6 %
OSMOLALITY SERPL CALC.SUM OF ELEC: 284 MOSM/KG (ref 275–295)
P AXIS: 85 DEGREES
P-R INTERVAL: 142 MS
PLATELET # BLD AUTO: 211 10(3)UL (ref 150–450)
POTASSIUM SERPL-SCNC: 3.7 MMOL/L (ref 3.5–5.1)
PROT SERPL-MCNC: 6.4 G/DL (ref 6.4–8.2)
PROTHROMBIN TIME: 13 SECONDS (ref 11.6–14.8)
Q-T INTERVAL: 386 MS
QRS DURATION: 72 MS
QTC CALCULATION (BEZET): 456 MS
R AXIS: 17 DEGREES
RBC # BLD AUTO: 4.26 X10(6)UL
SARS-COV-2 RNA RESP QL NAA+PROBE: NOT DETECTED
SODIUM SERPL-SCNC: 135 MMOL/L (ref 136–145)
T AXIS: 67 DEGREES
TROPONIN I HIGH SENSITIVITY: 3 NG/L
TROPONIN I HIGH SENSITIVITY: <3 NG/L
TROPONIN I HIGH SENSITIVITY: <3 NG/L
VENTRICULAR RATE: 84 BPM
WBC # BLD AUTO: 6.1 X10(3) UL (ref 4–11)

## 2022-07-31 PROCEDURE — 85730 THROMBOPLASTIN TIME PARTIAL: CPT | Performed by: EMERGENCY MEDICINE

## 2022-07-31 PROCEDURE — 71045 X-RAY EXAM CHEST 1 VIEW: CPT | Performed by: EMERGENCY MEDICINE

## 2022-07-31 PROCEDURE — 84484 ASSAY OF TROPONIN QUANT: CPT | Performed by: INTERNAL MEDICINE

## 2022-07-31 PROCEDURE — 93306 TTE W/DOPPLER COMPLETE: CPT | Performed by: CLINICAL NURSE SPECIALIST

## 2022-07-31 PROCEDURE — 85610 PROTHROMBIN TIME: CPT | Performed by: EMERGENCY MEDICINE

## 2022-07-31 RX ORDER — TAFLUPROST 0 MG/.3ML
1 SOLUTION/ DROPS OPHTHALMIC DAILY
COMMUNITY

## 2022-07-31 RX ORDER — BRIMONIDINE TARTRATE 2 MG/ML
1 SOLUTION/ DROPS OPHTHALMIC 2 TIMES DAILY
Status: DISCONTINUED | OUTPATIENT
Start: 2022-07-31 | End: 2022-07-31

## 2022-07-31 RX ORDER — ASPIRIN 81 MG/1
81 TABLET ORAL
COMMUNITY

## 2022-07-31 RX ORDER — METOCLOPRAMIDE HYDROCHLORIDE 5 MG/ML
10 INJECTION INTRAMUSCULAR; INTRAVENOUS EVERY 8 HOURS PRN
Status: DISCONTINUED | OUTPATIENT
Start: 2022-07-31 | End: 2022-08-01

## 2022-07-31 RX ORDER — ENOXAPARIN SODIUM 100 MG/ML
40 INJECTION SUBCUTANEOUS DAILY
Status: DISCONTINUED | OUTPATIENT
Start: 2022-07-31 | End: 2022-08-01

## 2022-07-31 RX ORDER — CYCLOSPORINE 0.5 MG/ML
EMULSION OPHTHALMIC 2 TIMES DAILY
COMMUNITY

## 2022-07-31 RX ORDER — CYCLOSPORINE 0.5 MG/ML
1 EMULSION OPHTHALMIC 2 TIMES DAILY
Status: DISCONTINUED | OUTPATIENT
Start: 2022-07-31 | End: 2022-08-01

## 2022-07-31 RX ORDER — ATORVASTATIN CALCIUM 10 MG/1
10 TABLET, FILM COATED ORAL DAILY
Status: DISCONTINUED | OUTPATIENT
Start: 2022-07-31 | End: 2022-08-01

## 2022-07-31 RX ORDER — ASPIRIN 81 MG/1
81 TABLET, CHEWABLE ORAL DAILY
Status: DISCONTINUED | OUTPATIENT
Start: 2022-07-31 | End: 2022-08-01

## 2022-07-31 RX ORDER — ONDANSETRON 2 MG/ML
4 INJECTION INTRAMUSCULAR; INTRAVENOUS EVERY 6 HOURS PRN
Status: DISCONTINUED | OUTPATIENT
Start: 2022-07-31 | End: 2022-08-01

## 2022-07-31 RX ORDER — FAMOTIDINE 20 MG/1
40 TABLET, FILM COATED ORAL DAILY
Status: DISCONTINUED | OUTPATIENT
Start: 2022-07-31 | End: 2022-08-01

## 2022-07-31 RX ORDER — MELATONIN
3 NIGHTLY PRN
Status: DISCONTINUED | OUTPATIENT
Start: 2022-07-31 | End: 2022-08-01

## 2022-07-31 RX ORDER — ACETAMINOPHEN 500 MG
500 TABLET ORAL EVERY 4 HOURS PRN
Status: DISCONTINUED | OUTPATIENT
Start: 2022-07-31 | End: 2022-08-01

## 2022-07-31 NOTE — PLAN OF CARE
Patient admitted from ER. Alert and oriented. Patient lives home with her  . Admitted with chest pain radiating to jaw. On admission, patient denies chest pain. Room air. No shortness of breath. Vitals stable. Skin intact. Plan: stress test.     Problem: CARDIOVASCULAR - ADULT  Goal: Maintains optimal cardiac output and hemodynamic stability  Description: INTERVENTIONS:  - Monitor vital signs, rhythm, and trends  - Monitor for bleeding, hypotension and signs of decreased cardiac output  - Evaluate effectiveness of vasoactive medications to optimize hemodynamic stability  - Monitor arterial and/or venous puncture sites for bleeding and/or hematoma  - Assess quality of pulses, skin color and temperature  - Assess for signs of decreased coronary artery perfusion - ex.  Angina  - Evaluate fluid balance, assess for edema, trend weights  Outcome: Progressing  Goal: Absence of cardiac arrhythmias or at baseline  Description: INTERVENTIONS:  - Continuous cardiac monitoring, monitor vital signs, obtain 12 lead EKG if indicated  - Evaluate effectiveness of antiarrhythmic and heart rate control medications as ordered  - Initiate emergency measures for life threatening arrhythmias  - Monitor electrolytes and administer replacement therapy as ordered  Outcome: Progressing

## 2022-07-31 NOTE — PLAN OF CARE
Patient alert and oriented; vital signs stable; cardiac monitor showing SR; lung sounds clear, on room air, no cough noted; no edema; continent of bowel and bladder with last bowel movement yesterday; patient ambulates independently with a steady gait, tolerates walks in the hallway; patient has own home eye drops that are not on formulary here, did get th okay for patient to take own home medications, medications were brought to pharmacy for verification; asked Case Management to have someone talk to patient about observation status; spoke to echo, patient to have 2D echo done today. 1600: Patient had questions about her observation status, had patient speak to . 1800: Patient said she had another eye drop she takes at night. Per Dr Sahri Phelps, okay to order for admission and patient may take home med as not on formulary here. Sent to pharmacy for approval. Patient has been ambulating in the hallway, tolerating well with no complaints. Problem: CARDIOVASCULAR - ADULT  Goal: Maintains optimal cardiac output and hemodynamic stability  Description: INTERVENTIONS:  - Monitor vital signs, rhythm, and trends  - Monitor for bleeding, hypotension and signs of decreased cardiac output  - Evaluate effectiveness of vasoactive medications to optimize hemodynamic stability  - Monitor arterial and/or venous puncture sites for bleeding and/or hematoma  - Assess quality of pulses, skin color and temperature  - Assess for signs of decreased coronary artery perfusion - ex.  Angina  - Evaluate fluid balance, assess for edema, trend weights  Outcome: Progressing  Goal: Absence of cardiac arrhythmias or at baseline  Description: INTERVENTIONS:  - Continuous cardiac monitoring, monitor vital signs, obtain 12 lead EKG if indicated  - Evaluate effectiveness of antiarrhythmic and heart rate control medications as ordered  - Initiate emergency measures for life threatening arrhythmias  - Monitor electrolytes and administer replacement therapy as ordered  Outcome: Progressing

## 2022-07-31 NOTE — ED QUICK NOTES
Orders for admission, patient is aware of plan and ready to go upstairs.  Any questions, please call ED RN Marianna Garrido at extension [phone]     Patient Covid vaccination status: Fully vaccinated     COVID Test Ordered in ED: Rapid SARS-CoV-2 by PCR    COVID Suspicion at Admission: Low clinical suspicion for COVID    Running Infusions:  None    Mental Status/LOC at time of transport: ALERT X 4    Other pertinent information:  PLS CALL CARDIOLOGIST IN AM, NTG PASTE APPLIED  CIWA score: N/A   NIH score:  N/A

## 2022-08-01 ENCOUNTER — APPOINTMENT (OUTPATIENT)
Dept: CV DIAGNOSTICS | Facility: HOSPITAL | Age: 72
End: 2022-08-01
Attending: INTERNAL MEDICINE
Payer: MEDICARE

## 2022-08-01 VITALS
TEMPERATURE: 98 F | SYSTOLIC BLOOD PRESSURE: 117 MMHG | DIASTOLIC BLOOD PRESSURE: 66 MMHG | HEART RATE: 66 BPM | OXYGEN SATURATION: 98 % | WEIGHT: 104.94 LBS | HEIGHT: 59.02 IN | BODY MASS INDEX: 21.16 KG/M2 | RESPIRATION RATE: 18 BRPM

## 2022-08-01 LAB
ANION GAP SERPL CALC-SCNC: 2 MMOL/L (ref 0–18)
BASOPHILS # BLD AUTO: 0.03 X10(3) UL (ref 0–0.2)
BASOPHILS NFR BLD AUTO: 0.6 %
BUN BLD-MCNC: 14 MG/DL (ref 7–18)
CALCIUM BLD-MCNC: 8.5 MG/DL (ref 8.5–10.1)
CHLORIDE SERPL-SCNC: 109 MMOL/L (ref 98–112)
CO2 SERPL-SCNC: 29 MMOL/L (ref 21–32)
CREAT BLD-MCNC: 0.51 MG/DL
EOSINOPHIL # BLD AUTO: 0.05 X10(3) UL (ref 0–0.7)
EOSINOPHIL NFR BLD AUTO: 0.9 %
ERYTHROCYTE [DISTWIDTH] IN BLOOD BY AUTOMATED COUNT: 13.3 %
GLUCOSE BLD-MCNC: 93 MG/DL (ref 70–99)
HCT VFR BLD AUTO: 39.4 %
HGB BLD-MCNC: 12.7 G/DL
IMM GRANULOCYTES # BLD AUTO: 0.02 X10(3) UL (ref 0–1)
IMM GRANULOCYTES NFR BLD: 0.4 %
LYMPHOCYTES # BLD AUTO: 2.23 X10(3) UL (ref 1–4)
LYMPHOCYTES NFR BLD AUTO: 42.2 %
MCH RBC QN AUTO: 28.2 PG (ref 26–34)
MCHC RBC AUTO-ENTMCNC: 32.2 G/DL (ref 31–37)
MCV RBC AUTO: 87.6 FL
MONOCYTES # BLD AUTO: 0.41 X10(3) UL (ref 0.1–1)
MONOCYTES NFR BLD AUTO: 7.8 %
NEUTROPHILS # BLD AUTO: 2.54 X10 (3) UL (ref 1.5–7.7)
NEUTROPHILS # BLD AUTO: 2.54 X10(3) UL (ref 1.5–7.7)
NEUTROPHILS NFR BLD AUTO: 48.1 %
OSMOLALITY SERPL CALC.SUM OF ELEC: 290 MOSM/KG (ref 275–295)
PLATELET # BLD AUTO: 216 10(3)UL (ref 150–450)
POTASSIUM SERPL-SCNC: 3.9 MMOL/L (ref 3.5–5.1)
RBC # BLD AUTO: 4.5 X10(6)UL
SODIUM SERPL-SCNC: 140 MMOL/L (ref 136–145)
TROPONIN I HIGH SENSITIVITY: 4 NG/L
WBC # BLD AUTO: 5.3 X10(3) UL (ref 4–11)

## 2022-08-01 PROCEDURE — 93018 CV STRESS TEST I&R ONLY: CPT | Performed by: INTERNAL MEDICINE

## 2022-08-01 PROCEDURE — 80048 BASIC METABOLIC PNL TOTAL CA: CPT | Performed by: INTERNAL MEDICINE

## 2022-08-01 PROCEDURE — 78452 HT MUSCLE IMAGE SPECT MULT: CPT | Performed by: INTERNAL MEDICINE

## 2022-08-01 PROCEDURE — 85025 COMPLETE CBC W/AUTO DIFF WBC: CPT | Performed by: INTERNAL MEDICINE

## 2022-08-01 PROCEDURE — 84484 ASSAY OF TROPONIN QUANT: CPT | Performed by: INTERNAL MEDICINE

## 2022-08-01 PROCEDURE — 93017 CV STRESS TEST TRACING ONLY: CPT | Performed by: INTERNAL MEDICINE

## 2022-08-01 RX ORDER — ASPIRIN 81 MG/1
81 TABLET ORAL
Qty: 30 TABLET | Refills: 0 | Status: SHIPPED | OUTPATIENT
Start: 2022-08-01

## 2022-08-01 NOTE — PLAN OF CARE
Assumed pt care at 0730. A&Ox4. Room air, denies pain/SOB, lung sounds clear, vitals stable, NSR on tele. Voids. Up w ad anthony. Plan for stress test today, possible dc pending results.  at bedside. POC updated with pt, questions answered, verbalized understanding. Will continue to monitor. Problem: Patient/Family Goals  Goal: Patient/Family Long Term Goal  Description: Patient's Long Term Goal: return home    Interventions:  - cardiac consult  - echo   - See additional Care Plan goals for specific interventions  Outcome: Progressing  Goal: Patient/Family Short Term Goal  Description: Patient's Short Term Goal: no chest pain    Interventions:   - cardiac monitoring  - See additional Care Plan goals for specific interventions  Outcome: Progressing     Problem: CARDIOVASCULAR - ADULT  Goal: Maintains optimal cardiac output and hemodynamic stability  Description: INTERVENTIONS:  - Monitor vital signs, rhythm, and trends  - Monitor for bleeding, hypotension and signs of decreased cardiac output  - Evaluate effectiveness of vasoactive medications to optimize hemodynamic stability  - Monitor arterial and/or venous puncture sites for bleeding and/or hematoma  - Assess quality of pulses, skin color and temperature  - Assess for signs of decreased coronary artery perfusion - ex.  Angina  - Evaluate fluid balance, assess for edema, trend weights  Outcome: Progressing  Goal: Absence of cardiac arrhythmias or at baseline  Description: INTERVENTIONS:  - Continuous cardiac monitoring, monitor vital signs, obtain 12 lead EKG if indicated  - Evaluate effectiveness of antiarrhythmic and heart rate control medications as ordered  - Initiate emergency measures for life threatening arrhythmias  - Monitor electrolytes and administer replacement therapy as ordered  Outcome: Progressing

## 2022-08-01 NOTE — PROGRESS NOTES
Cardiac diagnostic Note:    Pt walked 9:02 on Mod Mick protocol for nuclear stress test  Pt denied cardiac symptoms  No arrhythmias  nuc pending.

## 2022-08-01 NOTE — PLAN OF CARE
Assumed care of patient at [address]. Patient alert and oriented x4. NSR on telemetry. Denies any pain. On room air. NSR on telemetry. Patient ambulates with steady gait. Plan for possible stress test in morning. Call light within reach. Problem: Patient/Family Goals  Goal: Patient/Family Long Term Goal  Description: Patient's Long Term Goal: return home    Interventions:  - cardiac consult  - echo   - See additional Care Plan goals for specific interventions  Outcome: Progressing  Goal: Patient/Family Short Term Goal  Description: Patient's Short Term Goal: no chest pain    Interventions:   - cardiac monitoring  - See additional Care Plan goals for specific interventions  Outcome: Progressing     Problem: CARDIOVASCULAR - ADULT  Goal: Maintains optimal cardiac output and hemodynamic stability  Description: INTERVENTIONS:  - Monitor vital signs, rhythm, and trends  - Monitor for bleeding, hypotension and signs of decreased cardiac output  - Evaluate effectiveness of vasoactive medications to optimize hemodynamic stability  - Monitor arterial and/or venous puncture sites for bleeding and/or hematoma  - Assess quality of pulses, skin color and temperature  - Assess for signs of decreased coronary artery perfusion - ex.  Angina  - Evaluate fluid balance, assess for edema, trend weights  Outcome: Progressing  Goal: Absence of cardiac arrhythmias or at baseline  Description: INTERVENTIONS:  - Continuous cardiac monitoring, monitor vital signs, obtain 12 lead EKG if indicated  - Evaluate effectiveness of antiarrhythmic and heart rate control medications as ordered  - Initiate emergency measures for life threatening arrhythmias  - Monitor electrolytes and administer replacement therapy as ordered  Outcome: Progressing

## 2023-09-26 ENCOUNTER — OFFICE VISIT (OUTPATIENT)
Dept: INTEGRATIVE MEDICINE | Facility: CLINIC | Age: 73
End: 2023-09-26
Payer: MEDICARE

## 2023-09-26 VITALS
WEIGHT: 110.63 LBS | SYSTOLIC BLOOD PRESSURE: 98 MMHG | DIASTOLIC BLOOD PRESSURE: 60 MMHG | BODY MASS INDEX: 22 KG/M2 | OXYGEN SATURATION: 97 % | HEART RATE: 70 BPM

## 2023-09-26 DIAGNOSIS — K58.9 IRRITABLE BOWEL SYNDROME, UNSPECIFIED TYPE: ICD-10-CM

## 2023-09-26 DIAGNOSIS — K21.9 GASTROESOPHAGEAL REFLUX DISEASE WITHOUT ESOPHAGITIS: ICD-10-CM

## 2023-09-26 DIAGNOSIS — J45.21 MILD INTERMITTENT ASTHMA WITH ACUTE EXACERBATION: ICD-10-CM

## 2023-09-26 DIAGNOSIS — R14.1 GAS PAIN: Primary | ICD-10-CM

## 2023-09-26 DIAGNOSIS — E78.2 MIXED HYPERLIPIDEMIA: ICD-10-CM

## 2023-09-26 PROBLEM — K59.00 CONSTIPATED: Status: ACTIVE | Noted: 2023-09-26

## 2023-09-26 PROBLEM — M41.25 OTHER IDIOPATHIC SCOLIOSIS, THORACOLUMBAR REGION: Status: ACTIVE | Noted: 2023-09-05

## 2023-09-26 PROBLEM — L60.0 ONYCHOCRYPTOSIS: Status: ACTIVE | Noted: 2022-04-17

## 2023-09-26 PROBLEM — M54.16 LUMBAR RADICULITIS: Status: ACTIVE | Noted: 2023-05-24

## 2023-09-26 PROBLEM — R73.03 PREDIABETES: Status: ACTIVE | Noted: 2022-10-13

## 2023-09-26 PROBLEM — R82.994 IDIOPATHIC HYPERCALCIURIA: Status: ACTIVE | Noted: 2022-10-13

## 2023-09-26 PROBLEM — G45.9 TRANSIENT ISCHEMIC ATTACK (TIA): Status: ACTIVE | Noted: 2019-12-26

## 2023-09-26 PROCEDURE — 99204 OFFICE O/P NEW MOD 45 MIN: CPT | Performed by: FAMILY MEDICINE

## 2023-09-26 RX ORDER — ERYTHROMYCIN 5 MG/G
OINTMENT OPHTHALMIC
COMMUNITY
Start: 2023-07-21

## 2023-09-26 RX ORDER — FAMOTIDINE 20 MG/1
20 TABLET, FILM COATED ORAL 2 TIMES DAILY
COMMUNITY

## 2023-09-26 RX ORDER — IPRATROPIUM BROMIDE 42 UG/1
1 SPRAY, METERED NASAL DAILY
COMMUNITY
Start: 2023-09-12

## 2023-09-26 RX ORDER — ATORVASTATIN CALCIUM 10 MG/1
TABLET, FILM COATED ORAL
COMMUNITY
Start: 2023-07-08

## 2023-09-26 RX ORDER — COVID-19 ANTIGEN TEST
KIT MISCELLANEOUS
COMMUNITY
Start: 2023-04-29

## 2023-09-26 RX ORDER — CETIRIZINE HYDROCHLORIDE 10 MG/1
10 TABLET ORAL DAILY
COMMUNITY

## 2023-09-26 RX ORDER — HYDROCHLOROTHIAZIDE 12.5 MG/1
12.5 TABLET ORAL DAILY
COMMUNITY

## 2023-09-26 RX ORDER — AZELASTINE HYDROCHLORIDE 137 UG/1
SPRAY, METERED NASAL
COMMUNITY
Start: 2022-12-05

## 2023-09-26 RX ORDER — MONTELUKAST SODIUM 5 MG/1
5 TABLET, CHEWABLE ORAL NIGHTLY
Qty: 30 TABLET | Refills: 1 | Status: SHIPPED | OUTPATIENT
Start: 2023-09-26

## 2023-09-26 RX ORDER — HYDROCHLOROTHIAZIDE 12.5 MG/1
12.5 TABLET ORAL EVERY MORNING
COMMUNITY
Start: 2023-09-20

## 2023-09-26 RX ORDER — IBUPROFEN 200 MG
1 CAPSULE ORAL DAILY
COMMUNITY

## 2023-09-26 RX ORDER — GABAPENTIN 100 MG/1
CAPSULE ORAL
COMMUNITY
Start: 2023-09-21

## 2023-09-26 RX ORDER — LORATADINE 10 MG/1
5 TABLET ORAL DAILY
COMMUNITY

## 2023-09-26 RX ORDER — OXYMETAZOLINE HYDROCHLORIDE OPHTHALMIC 1 MG/ML
1 SOLUTION/ DROPS OPHTHALMIC DAILY
COMMUNITY
Start: 2023-03-16

## 2023-09-26 RX ORDER — CYCLOSPORINE 0.5 MG/ML
EMULSION OPHTHALMIC
COMMUNITY
Start: 2022-06-13

## 2025-01-17 ENCOUNTER — HOSPITAL ENCOUNTER (OUTPATIENT)
Dept: GENERAL RADIOLOGY | Facility: HOSPITAL | Age: 75
Discharge: HOME | End: 2025-01-17
Attending: INTERNAL MEDICINE
Payer: MEDICARE

## 2025-01-17 DIAGNOSIS — Z86.0101 ENCOUNTER FOR COLONOSCOPY DUE TO HISTORY OF ADENOMATOUS COLONIC POLYPS: ICD-10-CM

## 2025-01-17 DIAGNOSIS — R13.12 TRANSFER DYSPHAGIA: ICD-10-CM

## 2025-01-17 DIAGNOSIS — Z12.11 ENCOUNTER FOR COLONOSCOPY DUE TO HISTORY OF ADENOMATOUS COLONIC POLYPS: ICD-10-CM

## 2025-01-17 DIAGNOSIS — K92.9 FUNCTIONAL GASTROINTESTINAL DISORDER: ICD-10-CM

## 2025-01-17 DIAGNOSIS — K30 NONULCER DYSPEPSIA: ICD-10-CM

## 2025-01-17 DIAGNOSIS — K59.09 CHRONIC CONSTIPATION: ICD-10-CM

## 2025-01-17 PROCEDURE — 92611 MOTION FLUOROSCOPY/SWALLOW: CPT

## 2025-01-17 PROCEDURE — 74230 X-RAY XM SWLNG FUNCJ C+: CPT | Performed by: INTERNAL MEDICINE

## 2025-01-17 NOTE — PROGRESS NOTES
ADULT VIDEOFLUOROSCOPIC SWALLOWING STUDY    Admission Date: 1/17/2025  Evaluation Date: 01/17/25  Radiologist: Dr. Zimmerman    RECOMMENDATIONS   Diet Recommendations - Solids: Regular  Diet Recommendations - Liquids: Thin Liquids    Further Follow-up:           Aspiration Precautions: Upright position;Slow rate;Small bites;Small sips  Medication Administration Recommendations:  (as tolerated)  Treatment Plan/Recommendations:  (Follow up with referring physician)    HISTORY   Background/Objective Information:  Patient provided history. She reported neurogenic cough since 1998. She notes her current dysphagia is not new but it is worsening. She reports swallowing liquids results in sensation of slow clearance or swallowing may not work. She reports consecutive swallows are not possible for her. She reports solids (she reported she tends to cook her chicken breast to be well done and a bit dry) go down slowly as well.     Problem List  Active Problems:    * No active hospital problems. *      Past Medical History  Past Medical History:    Anxiety state, unspecified    ASTHMA    BPPV (benign paroxysmal positional vertigo)    Cervicalgia    Depression    Esophageal reflux    Fibrocystic disease of breast    GLAUCOMA NOS    IBS (irritable bowel syndrome)    Kidney mass    Lipoma    MENOPAUSE    Mixed hyperlipidemia    Osteopenia    Osteoporosis    Palpitations    Pre-diabetes    Proctalgia fugax    Tietze's disease    TMJ disorder    VITAMIN D DEFICIENCY NOS       Current Diet Consistency: Regular;Thin liquids              Imaging results: No recent available applicable imaging    Reason for Referral:  (difficulty swallowing)      Family/Patient Goals:  To find reason for difficulty swallowing     ASSESSMENT   DYSPHAGIA ASSESSMENT  Test completed in conjunction with Radiologist.  Patient Positioned: Upright;Midline;Standard chair.  Patient Viewed: Lateral.   .  Consistencies Presented: Thin liquids;Hard solid to assess  oropharyngeal swallow function and assess for compensatory strategies to improve safe swallow function.                      Penetration Aspiration Scale Score: Score 1: Material does not enter airway       Overall Impression: Patient presents with grossly functional oropharyngeal swallow. She was able to execute consecutive swallows with thin liquids and swallowed solid trials without incident. She reported no sensation of po sticking or passing slowly was perceived during this exam. There was no laryngeal penetration or tracheal aspiration observed. There was a small amount of thin contrast that was consistently noted at the inferior margin of the velum as it contacted the posterior pharyngeal wall. This cleared as anatomy resumed resting position. There was no nasal regurgitation though noted consistently across swallows with thin liquids. This was not observed with solid trials. Likely an incidental finding as it does not correlate with patient complaints.               PLAN: follow up with referring physician    EDUCATION/INSTRUCTION  Reviewed results and recommendations with patient/family/caregiver.  Agreement/Understanding verbalized and all questions answered to their apparent satisfaction.    INTERDISCIPLINARY COMMUNICATION  Reviewed results with Radiologist; agreement verbalized.                     FOLLOW UP  Treatment Plan/Recommendations:  (Follow up with referring physician)       Thank you for your referral.   If you have any questions, please contact Shahram David MS [facility]  [place] [phone]

## 2025-02-06 ENCOUNTER — OFFICE VISIT (OUTPATIENT)
Dept: INTERNAL MEDICINE CLINIC | Facility: CLINIC | Age: 75
End: 2025-02-06
Payer: MEDICARE

## 2025-02-06 VITALS
SYSTOLIC BLOOD PRESSURE: 100 MMHG | OXYGEN SATURATION: 97 % | DIASTOLIC BLOOD PRESSURE: 70 MMHG | HEART RATE: 79 BPM | HEIGHT: 59 IN | BODY MASS INDEX: 22 KG/M2 | RESPIRATION RATE: 16 BRPM | TEMPERATURE: 98 F

## 2025-02-06 DIAGNOSIS — R82.994 IDIOPATHIC HYPERCALCIURIA: ICD-10-CM

## 2025-02-06 DIAGNOSIS — L30.8 OTHER SPECIFIED DERMATITIS: ICD-10-CM

## 2025-02-06 DIAGNOSIS — Z86.0100 HISTORY OF COLON POLYPS: ICD-10-CM

## 2025-02-06 DIAGNOSIS — R05.3 CHRONIC COUGH: ICD-10-CM

## 2025-02-06 DIAGNOSIS — N20.0 NEPHROLITHIASIS: ICD-10-CM

## 2025-02-06 DIAGNOSIS — J38.3 DISORDER OF VOCAL CORD: ICD-10-CM

## 2025-02-06 DIAGNOSIS — E78.41 ELEVATED LIPOPROTEIN(A): ICD-10-CM

## 2025-02-06 DIAGNOSIS — H04.123 DRY EYE SYNDROME OF BOTH EYES: ICD-10-CM

## 2025-02-06 DIAGNOSIS — I31.39: ICD-10-CM

## 2025-02-06 DIAGNOSIS — M47.816 LUMBAR SPONDYLOSIS: ICD-10-CM

## 2025-02-06 DIAGNOSIS — F41.1 GAD (GENERALIZED ANXIETY DISORDER): ICD-10-CM

## 2025-02-06 DIAGNOSIS — G56.03 BILATERAL CARPAL TUNNEL SYNDROME: ICD-10-CM

## 2025-02-06 DIAGNOSIS — M41.85 LEVOSCOLIOSIS OF THORACOLUMBAR SPINE: ICD-10-CM

## 2025-02-06 DIAGNOSIS — M81.6 LOCALIZED OSTEOPOROSIS WITHOUT CURRENT PATHOLOGICAL FRACTURE: ICD-10-CM

## 2025-02-06 DIAGNOSIS — K92.9 FUNCTIONAL GASTROINTESTINAL DISORDER: ICD-10-CM

## 2025-02-06 DIAGNOSIS — N28.1 BENIGN CYST OF RIGHT KIDNEY: ICD-10-CM

## 2025-02-06 DIAGNOSIS — J45.20 MILD INTERMITTENT ASTHMA WITHOUT COMPLICATION (HCC): ICD-10-CM

## 2025-02-06 DIAGNOSIS — R73.03 PREDIABETES: ICD-10-CM

## 2025-02-06 DIAGNOSIS — H25.13 AGE-RELATED NUCLEAR CATARACT OF BOTH EYES: ICD-10-CM

## 2025-02-06 DIAGNOSIS — E78.00 PURE HYPERCHOLESTEROLEMIA: Primary | ICD-10-CM

## 2025-02-06 DIAGNOSIS — J32.9 CHRONIC RHINOSINUSITIS: ICD-10-CM

## 2025-02-06 DIAGNOSIS — H40.1233 LOW-TENSION GLAUCOMA OF BOTH EYES, SEVERE STAGE: ICD-10-CM

## 2025-02-06 DIAGNOSIS — Z86.73 HISTORY OF TIA (TRANSIENT ISCHEMIC ATTACK): ICD-10-CM

## 2025-02-06 DIAGNOSIS — D17.71 ANGIOMYOLIPOMA OF LEFT KIDNEY: ICD-10-CM

## 2025-02-06 RX ORDER — ATORVASTATIN CALCIUM 20 MG/1
TABLET, FILM COATED ORAL
COMMUNITY
Start: 2025-01-02

## 2025-02-06 NOTE — PROGRESS NOTES
The 21st Century Cures Act makes medical notes like these available to patients in the interest of transparency. Please be advised this is a medical document. Medical documents are intended to carry relevant information, facts as evident, and the clinical opinion of the practitioner. The medical note is intended as peer to peer communication and may appear blunt or direct. It is written in medical language and may contain abbreviations or verbiage that are unfamiliar.           Chief Complaint   Patient presents with    [facility]       HPI: Adilene is a 75 y/o WF, new patient, who presents today to establish primary care and for ongoing management of her health and wellness plan.  She is a former patient of my colleague Dr. Amie Batres from [facility].  Her health goals center around broad themes of longevity, vitality, and quality of life.  She did undergo her Medicare AWV last month with Dr. Batres.  Her chronic medical conditions are as follows:    Hypercholesterolemia and elevated lipoprotein(a).  Stable on prescription medication + lifestyle measures.  Her last LDL-c was 68 on 10/11/24.  She works with Dr. Luz from Cardiology.  Last nuclear stress test showed no myocardial perfusion defect back on 5/9/24.  No new issues.  Chronic, small pericardial effusion.  No new issues.  Most recently seen on 9/9/24 CT abd/pelvis in the lung base windows.  Has worked with Dr. Luz.  Management is supportive.  History of TIA x 2 per patient, most recent 2019.  She saw Dr. Moon from [facility] last on 9/17/24.  She is on statin + ASA for secondary prevention measures.  The ASA is taken q 3 days and she's tolerating it well.  She did try Plavix in the past and could not tolerate it.  Dr. Moon has left the practice, so her new neurologist is Dr. Beaver from [facility].  Appointment is set for 6/2/25.  Prediabetes.  Working on lifestyle measures.  Last A1c was 5.7% back on 10/11/24.  No  new issues.  Neurogenic cough, chronic.  She is considered stable on Gabapentin.  No new issues.  Bilateral carpal tunnel syndrome.  Longstanding history.  This was commented on at her 9/17/24 visit with Dr. Moon.  She does not wear splints.  Management has been conservative.  No new issues.  Lumbar spondylosis and levoscoliosis.  Chronic history.  Last saw Dr. Hilario from Physiatry on 8/20/24.  Last X-ray scoliosis protocol was done on 4/19/24.  She still gets stiffness and pain, however, she's worked with physical therapy in the past as well as personal training.  No new issues.  Mild, intermittent asthma without complication.  Stable and no new issues.  Other specified dermatitis.  This is of the eyelids.  Has worked with Dr. Raymond from Dermatology.  Last OV dated 9/23/24.  No new issues.  Bilateral dry eye syndrome.  Ophthalmologist is Dr. Drew Heller.  Remains on Restasis.  No new issues.  Age-related nuclear cataracts.  Historical.  Continues to see Ophtho.  No new issues.  Severe glaucoma, s/p laser trabeculoplasty left eye on 12/11/24 and s/p same procedure right eye on 12/2/24 with Dr. Chriss Tijerina.  She continues with regular follow up.  Functional GI disorder.  Her GI health has been sub-optimal.  Working with Dr. Burks from [facility].  Just underwent a video swallow eval on 1/17/25 which was essentially normal.  The speech pathologist had nothing to add.  She does have a follow up virtual visit with him on 2/26/25.  She's never undergone food sensitivity testing and has never been tested for SIBO.  She has tested negative for Celiac disease in the past.  Has had multiple endoscopies and she feels like her GI health is most adversely affecting her QOL at this point.  She inquires about possibly trying low dose naltrexone to see if it would help because of its novel anti-inflammatory properties.  She does not have IBD, but there's been mentioning about possible IBS in the medical record.  For now, she  remains on Famotidine, Sucralfate, and Probiotic use.  ARPIT.  Historical.  Not on any specific pharmacotherapy.  No new issues.  Osteoporosis.  She is working with Dr. Horan from [facility].  She's received Reclast.  Chronic rhinosinusitis.  Stable on Azelastine spray.  Last CT Sinus protocol was done on 9/10/23.  Has worked with Dr. Valentino from ENT in the past.  No new issues.  Vocal cord disorder.  She's been working with Dr. Bonilla from [facility].  Has received steroid injection treatment in the past.  No new issues.  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  Urologist is Dr. Meléndez from [facility].  No new issues.  Bilateral nephrolithiasis and idiopathic hypercalciuria.  Longstanding history dating back many years.  Last imaging was via CT abd/pelvis on 9/9/24.  The stones are non-obstructive.  No new issues.  It is noted that there was an abnormal finding at the [facility].  Urology plans on repeating the ultrasound in the coming months.  Dr. Meléndez's team did advise cystoscopy, but she respectfully declined.  Benign right kidney cyst.  It was minimally complex and last seen on 9/9/24 CT abd/pelvis.  History of colon polyps.  Her last C-scope was dated 7/10/20 and was performed by Dr. Houser.  A single 3 mm hyperplastic polyp was removed from the sigmoid colon.  Health maintenance/prevention.  Mammograms are ordered by Dr. Aiken from [facility].  Last mammo dated 8/23/24.  Breast ultrasound was done on this same date.  She's never undergone the Galleri Test as an adjunct to USPSTF age-appropriate cancer screening guidelines.    Review of Systems   All other systems reviewed and are negative.     Patient Active Problem List   Diagnosis    ARPIT (generalized anxiety disorder)    Adenomatous polyp of colon, unspecified part of colon    Benign cyst of right kidney    Osteoporosis    Chronic rhinosinusitis     Angiomyolipoma of left kidney    Low-tension glaucoma of both eyes, severe stage    Mild intermittent asthma without complication (HCC)    History of TIA (transient ischemic attack) x 2    Age-related nuclear cataract of both eyes    Pure hypercholesterolemia    Chronic pericardial effusion (HCC)    Idiopathic hypercalciuria    Lumbar spondylosis    Levoscoliosis of thoracolumbar spine    Prediabetes    Dry eye syndrome of both eyes    Neurogenic cough, chronic    Bilateral carpal tunnel syndrome    Functional gastrointestinal disorder    Bilateral, nonobstructive, chronic nephrolithiasis    Disorder of vocal cord    Elevated lipoprotein(a)    Other specified dermatitis (Eyelid dermatitis)         Past Surgical History:   Procedure Laterality Date    Colonoscopy N/A 8/22/2016    Colonic Polyps (adenomas)Procedure: COLONOSCOPY;  Surgeon: Marc Houser MD;  Location:  ENDOSCOPY    Colonoscopy  07/10/2020    TI wnl, sigmoid polyp    Colonoscopy,biopsy  2005    adenoma    Colonoscopy,diagnostic  1/12/10    wnl, hemorrhoids    D & c  2009    post men bleeding    Egd  07/10/2020    esophageal, gastric and small bowel bx taken    Egd N/A 7/10/2020    Procedure: ESOPHAGOGASTRODUODENOSCOPY, COLONOSCOPY, POSSIBLE BIOPSY, POSSIBLE POLYPECTOMY 00059, 80502;  Surgeon: Marc Houser MD;  [facility]    G-esoph reflx tst w/electrod  3/19-3/21/13    48 Rob[place]o on Dexilant/Zantac shows 0 reflux in a 48 hour period    Hemorrhoidectomy      Hernia surgery  08/06/2020    Repair incarcerated femoral hernia    Upper gi endoscopy,biopsy  6/5/06    wnl, Gastric and SB Bx wnl    Upper gi endoscopy,biopsy  1993    gastritis, bx for H. pylori negtive    Upper gi endoscopy,biopsy  1/12/10    wnl, Gastric and SB Bx wnl    Upper gi endoscopy,diagnosis  2001    wnl    Upper gi endoscopy,diagnosis  3/19/13    wnl, small hiatal hernia, proximal and distal esophageal bx normal    Upper gi endoscopy,diagnosis   02/01/2018    normal, esophageal, gastric and SB Bx negative       Isovue [isovue-m], Desipramine hcl, Omeprazole, and Prednisone      Current Outpatient Medications:     atorvastatin 20 MG Oral Tab, , Disp: , Rfl:     Sucralfate (CARAFATE OR), 1 every 3 days, Disp: , Rfl:     Omega-3 Fatty Acids (OMEGA 3 500 OR), Take 500 mg by mouth As Directed., Disp: , Rfl:     Azelastine HCl 137 MCG/SPRAY Nasal Solution, , Disp: , Rfl:     gabapentin 100 MG Oral Cap, Take 1 capsule (100 mg total) by mouth daily., Disp: , Rfl:     hydroCHLOROthiazide 12.5 MG Oral Tab, Take 1 tablet (12.5 mg total) by mouth every morning., Disp: , Rfl:     aspirin 81 MG Oral Tab EC, Take 1 tablet (81 mg total) by mouth every 3 (three) days., Disp: , Rfl:     cycloSPORINE (RESTASIS OP), Apply 1 drop to eye. Each eye, Disp: , Rfl:     FAMOTIDINE 40 MG Oral Tab, TAKE 1 TABLET BY MOUTH EVERY DAY, Disp: 90 tablet, Rfl: 0    Eyelid Cleansers (STERILID EX), , Disp: , Rfl:     Probiotic Product (PROBIOTIC DAILY OR), Take by mouth., Disp: , Rfl:     Calcium Citrate 200 MG Oral Tab, 1 tablet., Disp: , Rfl:     Cholecalciferol (VITAMIN D3) 2000 units Oral Tab, Take 1 capsule by mouth.  , Disp: , Rfl:       Social History     Socioeconomic History    Marital status:    Tobacco Use    Smoking status: Never    Smokeless tobacco: Never   Vaping Use    Vaping status: Never Used   Substance and Sexual Activity    Alcohol use: No     Alcohol/week: 0.0 standard drinks of alcohol    Drug use: No    Sexual activity: Never     Partners: Male   Other Topics Concern    Caffeine Concern No    Exercise Yes     Comment: lots   Social History Narrative    : 1971    Children: 2    Exercise: walks, [employer] less so,     Employment: retired     Caffeine intake: minimal           Family History   Problem Relation Age of Onset    Glaucoma Mother     Eye Problems Mother     Other (Other) Mother     Diabetes Father         Type 2 DM     Heart Disease Father     Pulmonary Disease Father     Renal Disease Father     Hypertension Father     Kidney Disease Father     High Cholesterol Father     Lung Disorder Father     Heart Surgery Father     Pacemaker Father     Prostate Cancer Father     Other (Other) Father     Diabetes Sister     Lipids Sister     Hypertension Sister     High Cholesterol Sister     Asthma Sister     Lipids Brother     High Cholesterol Brother     High Cholesterol Daughter     Other (Other) Daughter     High Cholesterol Son     Gastro-Intestinal Disorder Son     Other (Other) Paternal Grandmother         Kyphosis    Breast Cancer Maternal Aunt 55        dx age 55    Breast Cancer Maternal Cousin Female 60        age at dx 60       Immunization History   Administered Date(s) Administered    Covid-19 Vaccine Moderna 100 mcg/0.5 ml 01/28/2021, 02/26/2021, 09/24/2021, 04/30/2022    Covid-19 Vaccine Pfizer Bivalent 30mcg/0.3mL 09/21/2022    FLU VAC High Dose 65 YRS & Older PRSV Free (62201) 09/19/2014, 10/06/2015, 10/07/2016, 09/15/2017, 09/27/2018, 10/01/2019, 10/14/2021, 10/06/2022    Fluvirin, 3 Years & >, Im 09/21/2012    Fluzone Vaccine Medicare () 09/19/2014, 10/06/2015, 10/07/2016, 09/15/2017, 09/27/2018, 10/01/2019, 08/31/2020    HEP A 08/19/2011, 07/13/2012    Influenza 10/11/2007, 09/17/2009, 11/08/2009, 10/25/2013    Influenza Vaccine, trivalent (IIV3), 0.5mL IM 10/07/2008, 10/01/2010, 11/05/2011    Moderna Covid-19 Vaccine 50mcg/0.5ml 12yrs+ 09/22/2023, 09/07/2024    Pfizer Covid-19 Vaccine 30mcg/0.3ml 12yrs+ 03/30/2024    Pneumococcal (Prevnar 13) 08/03/2015    Pneumovax 23 02/07/2014    TDAP 01/05/2007, 06/25/2016, 07/11/2016    Zoster Vaccine Live (Zostavax) 02/02/2009    Zoster Vaccine Recombinant Adjuvanted (Shingrix) 07/10/2018, 08/28/2018, 11/24/2018, 12/15/2018       PE:  /70 (BP Location: Left arm, Patient Position: Sitting, Cuff Size: adult)   Pulse 79   Temp 97.9 °F (36.6 °C) (Temporal)   Resp 16    Ht 4' 11\" (1.499 m)   SpO2 97%   BMI 22.34 kg/m²   Gen - A&Ox3, NAD  HEENT - PERRL, EOMI, OP is clear  Lungs - CTAB  CV - RRR, nl s1, s1, no M  Abd - soft, NABS, NT, ND, no HSM  Ext - no c/c/e  Neuro - No focal deficits, levoscoliosis is present with ambulation  Psych - nl mood/affect      A/P:  Encounter Diagnoses   Name Primary?    Pure hypercholesterolemia Yes    Elevated lipoprotein(a)     Chronic pericardial effusion (HCC)     History of TIA (transient ischemic attack) x 2     Prediabetes     Neurogenic cough, chronic     Bilateral carpal tunnel syndrome     Lumbar spondylosis     Levoscoliosis of thoracolumbar spine     Mild intermittent asthma without complication (HCC)     Other specified dermatitis (Eyelid dermatitis)     Dry eye syndrome of both eyes     Age-related nuclear cataract of both eyes     Low-tension glaucoma of both eyes, severe stage     Functional gastrointestinal disorder     ARPIT (generalized anxiety disorder)     Localized osteoporosis without current pathological fracture     Chronic rhinosinusitis     Disorder of vocal cord     Angiomyolipoma of left kidney     Bilateral, nonobstructive, chronic nephrolithiasis     Idiopathic hypercalciuria     Benign cyst of right kidney     History of colon polyps      Hypercholesterolemia and elevated lipoprotein(a).  Stable on prescription medication + lifestyle measures.  Her last LDL-c was 68 on 10/11/24.  She works with Dr. Luz from Cardiology.  Last nuclear stress test showed no myocardial perfusion defect back on 5/9/24.  No new issues.  Of note, I'd like her to go thru a CVD panel thru our partnership thru [facility].  Specifically, I'd like the following: MPO, Lp-PLA2 Activity, ADMA/SDMA, hs-CRP, OxLDL, and OmegaCheck.  If I can get TMAO, I'll add it too.  Homocysteine would be nice as well.  Chronic, small pericardial effusion.  No new issues.  Most recently seen on 9/9/24 CT abd/pelvis in the lung base windows.  Has worked with  Dr. Luz.  Management is supportive.  History of TIA x 2 per patient, most recent 2019.  She saw Dr. Moon from [facility] last on 9/17/24.  She is on statin + ASA for secondary prevention measures.  The ASA is taken q 3 days and she's tolerating it well.  She did try Plavix in the past and could not tolerate it.  Dr. Moon has left the practice, so her new neurologist is Dr. Beaver from [facility].  Appointment is set for 6/2/25.  Prediabetes.  Working on lifestyle measures.  Last A1c was 5.7% back on 10/11/24.  No new issues.  Neurogenic cough, chronic.  She is considered stable on Gabapentin.  No new issues.  Bilateral carpal tunnel syndrome.  Longstanding history.  This was commented on at her 9/17/24 visit with Dr. Moon.  She does not wear splints.  Management has been conservative.  No new issues.  Lumbar spondylosis and levoscoliosis.  Chronic history.  Last saw Dr. Hilario from Physiatry on 8/20/24.  Last X-ray scoliosis protocol was done on 4/19/24.  She still gets stiffness and pain, however, she's worked with physical therapy in the past as well as personal training.  No new issues.  I did encourage continued self-care and engagement in physical fitness and other support measures.  Mild, intermittent asthma without complication.  Stable and no new issues.  Other specified dermatitis.  This is of the eyelids.  Has worked with Dr. Raymond from Dermatology.  Last OV dated 9/23/24.  No new issues.  Bilateral dry eye syndrome.  Ophthalmologist is Dr. Drew Heller.  Remains on Restasis.  No new issues.  Age-related nuclear cataracts.  Historical.  Continues to see Ophtho.  No new issues.  Severe glaucoma, s/p laser trabeculoplasty left eye on 12/11/24 and s/p same procedure right eye on 12/2/24 with Dr. Chriss Tijerina.  She continues with regular follow up.  Functional GI disorder.  Her GI health has been sub-optimal.  Working with Dr. Burks from [facility].  Just underwent a video swallow eval on  1/17/25 which was essentially normal.  The speech pathologist had nothing to add.  She does have a follow up virtual visit with him on 2/26/25.  She's never undergone food sensitivity testing and has never been tested for SIBO.  She has tested negative for Celiac disease in the past.  Has had multiple endoscopies and she feels like her GI health is most adversely affecting her QOL at this point.  She inquires about possibly trying low dose naltrexone to see if it would help because of its novel anti-inflammatory properties.  She does not have IBD, but there's been mentioning about possible IBS in the medical record.  For now, she remains on Famotidine, Sucralfate, and Probiotic use.  I'll check a food sensitivity profile and will order SIBO test.  Low dose naltrexone may be of benefit, purportedly thru improvement of the epithelial barrier function.  She also benefit from my colleague Dr. Criss Quick (GI Natropath/Functional Med).  We'll have close follow up.  I did advise her to keep the virtual appointment with Dr. Burks as planned for 2/26/25 which was set up as follow up from the recent video swallow.  ARPIT.  Historical.  Not on any specific pharmacotherapy.  No new issues.  Osteoporosis.  She is working with Dr. Horan from [facility].  She's received Reclast.  Chronic rhinosinusitis.  Stable on Azelastine spray.  Last CT Sinus protocol was done on 9/10/23.  Has worked with Dr. Valentino from ENT in the past.  No new issues.  Vocal cord disorder.  She's been working with Dr. Bonilla from [facility].  Has received steroid injection treatment in the past.  No new issues.  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  Urologist is Dr. Meléndez from [facility].  No new issues.  Bilateral nephrolithiasis and idiopathic hypercalciuria.  Longstanding history dating back many years.  Last imaging was via CT abd/pelvis on 9/9/24.  The  stones are non-obstructive.  No new issues.  It is noted that there was an abnormal finding at the [facility].  Urology plans on repeating the ultrasound in the coming months.  Dr. Meléndez's team did advise cystoscopy, but she respectfully declined.  Benign right kidney cyst.  It was minimally complex and last seen on 9/9/24 CT abd/pelvis.  History of colon polyps.  Her last C-scope was dated 7/10/20 and was performed by Dr. Houser.  A single 3 mm hyperplastic polyp was removed from the sigmoid colon.  Health maintenance/prevention.  Mammograms are ordered by Dr. Aiken from [facility].  Last mammo dated 8/23/24.  Breast ultrasound was done on this same date.  She's never undergone the Galleri Test as an adjunct to USPSTF age-appropriate cancer screening guidelines.  She is interested.  Clinical staff to help with logistics of [facility] Testing.  RTC 1-3 months for follow up.  She verbally agrees to and understands the plan as outlined above.  She was also afforded the time and opportunity to ask questions, which were then answered to the best of my ability.        Lam Hines MD  [facility] Physician  Diplomate, American Board of Internal Medicine  Member, American College of Lifestyle Medicine  Member, American Association for Physician Leadership  [address], [address], [place] [address]  [phone] (phone); [phone] (fax)  [email or web address]           Meds & Refills for this Visit:  Requested Prescriptions      No prescriptions requested or ordered in this encounter       Imaging & Consults:  None      Total face to face time was 60 minutes, more than 50% of the time was spent in counseling and/or coordination of care related to chronic medical conditons.

## 2025-02-08 PROBLEM — K92.9 FUNCTIONAL GASTROINTESTINAL DISORDER: Status: ACTIVE | Noted: 2025-02-08

## 2025-02-08 PROBLEM — R47.89 WORD FINDING DIFFICULTY: Status: RESOLVED | Noted: 2019-12-26 | Resolved: 2025-02-08

## 2025-02-08 PROBLEM — L30.8 OTHER SPECIFIED DERMATITIS: Status: ACTIVE | Noted: 2025-02-08

## 2025-02-08 PROBLEM — N20.0 NEPHROLITHIASIS: Status: ACTIVE | Noted: 2025-02-08

## 2025-02-08 PROBLEM — O24.419 GESTATIONAL DIABETES MELLITUS (HCC): Status: RESOLVED | Noted: 2018-02-16 | Resolved: 2025-02-08

## 2025-02-08 PROBLEM — R73.9 HYPERGLYCEMIA: Status: RESOLVED | Noted: 2022-07-31 | Resolved: 2025-02-08

## 2025-02-08 PROBLEM — Z86.73 HISTORY OF TIA (TRANSIENT ISCHEMIC ATTACK): Status: ACTIVE | Noted: 2019-12-26

## 2025-02-08 PROBLEM — I31.39 PERICARDIAL EFFUSION (HCC): Status: RESOLVED | Noted: 2020-07-15 | Resolved: 2025-02-08

## 2025-02-08 PROBLEM — G56.03 BILATERAL CARPAL TUNNEL SYNDROME: Status: ACTIVE | Noted: 2025-02-08

## 2025-02-08 PROBLEM — R07.9 ACUTE CHEST PAIN: Status: RESOLVED | Noted: 2022-07-31 | Resolved: 2025-02-08

## 2025-02-08 PROBLEM — R05.3 CHRONIC COUGH: Status: ACTIVE | Noted: 2025-02-08

## 2025-02-08 PROBLEM — E78.41 ELEVATED LIPOPROTEIN(A): Status: ACTIVE | Noted: 2025-02-08

## 2025-02-08 PROBLEM — J45.20 MILD INTERMITTENT ASTHMA WITHOUT COMPLICATION (HCC): Status: ACTIVE | Noted: 2019-05-07

## 2025-02-08 PROBLEM — R79.89 AZOTEMIA: Status: RESOLVED | Noted: 2022-07-31 | Resolved: 2025-02-08

## 2025-02-08 PROBLEM — K30 NUD (NONULCER DYSPEPSIA): Status: RESOLVED | Noted: 2017-12-08 | Resolved: 2025-02-08

## 2025-02-08 PROBLEM — I31.39: Status: ACTIVE | Noted: 2020-07-15

## 2025-02-08 PROBLEM — H04.123 DRY EYE SYNDROME OF BOTH EYES: Status: ACTIVE | Noted: 2025-02-08

## 2025-02-08 PROBLEM — L60.0 ONYCHOCRYPTOSIS: Status: RESOLVED | Noted: 2022-04-17 | Resolved: 2025-02-08

## 2025-02-08 PROBLEM — J90 PLEURAL EFFUSION ON RIGHT: Status: RESOLVED | Noted: 2020-07-15 | Resolved: 2025-02-08

## 2025-02-08 PROBLEM — E87.1 HYPONATREMIA: Status: RESOLVED | Noted: 2022-07-31 | Resolved: 2025-02-08

## 2025-02-08 PROBLEM — J38.3 DISORDER OF VOCAL CORD: Status: ACTIVE | Noted: 2025-02-08

## 2025-02-08 PROBLEM — R20.2 NUMBNESS AND TINGLING IN BOTH HANDS: Status: RESOLVED | Noted: 2019-12-26 | Resolved: 2025-02-08

## 2025-02-08 PROBLEM — M41.85 LEVOSCOLIOSIS OF THORACOLUMBAR SPINE: Status: ACTIVE | Noted: 2023-09-05

## 2025-02-08 PROBLEM — R94.31 ABNORMAL EKG: Status: RESOLVED | Noted: 2022-07-31 | Resolved: 2025-02-08

## 2025-02-08 PROBLEM — M47.816 LUMBAR SPONDYLOSIS: Status: ACTIVE | Noted: 2023-05-24

## 2025-02-08 PROBLEM — K59.00 CONSTIPATED: Status: RESOLVED | Noted: 2023-09-26 | Resolved: 2025-02-08

## 2025-02-08 PROBLEM — R20.0 NUMBNESS AND TINGLING IN BOTH HANDS: Status: RESOLVED | Noted: 2019-12-26 | Resolved: 2025-02-08

## 2025-02-10 ENCOUNTER — VIRTUAL PHONE E/M (OUTPATIENT)
Dept: INTERNAL MEDICINE CLINIC | Facility: CLINIC | Age: 75
End: 2025-02-10
Payer: MEDICARE

## 2025-02-10 DIAGNOSIS — E78.00 PURE HYPERCHOLESTEROLEMIA: ICD-10-CM

## 2025-02-10 DIAGNOSIS — J45.20 MILD INTERMITTENT ASTHMA WITHOUT COMPLICATION (HCC): ICD-10-CM

## 2025-02-10 DIAGNOSIS — J38.3 DISORDER OF VOCAL CORD: ICD-10-CM

## 2025-02-10 DIAGNOSIS — Z86.0100 HISTORY OF COLON POLYPS: ICD-10-CM

## 2025-02-10 DIAGNOSIS — N20.0 NEPHROLITHIASIS: ICD-10-CM

## 2025-02-10 DIAGNOSIS — R82.994 IDIOPATHIC HYPERCALCIURIA: ICD-10-CM

## 2025-02-10 DIAGNOSIS — Z86.73 HISTORY OF TIA (TRANSIENT ISCHEMIC ATTACK): Primary | ICD-10-CM

## 2025-02-10 DIAGNOSIS — Z82.49 FAMILY HISTORY OF HEART DISEASE: ICD-10-CM

## 2025-02-10 DIAGNOSIS — K92.9 FUNCTIONAL GASTROINTESTINAL DISORDER: ICD-10-CM

## 2025-02-10 NOTE — PROGRESS NOTES
The 21st Century Cures Act makes medical notes like these available to patients in the interest of transparency. Please be advised this is a medical document. Medical documents are intended to carry relevant information, facts as evident, and the clinical opinion of the practitioner. The medical note is intended as peer to peer communication and may appear blunt or direct. It is written in medical language and may contain abbreviations or verbiage that are unfamiliar.           Virtual Telephone Check-In    Adilene Garvin verbally consents to a Virtual/Telephone Check-In visit on 02/10/25.  Patient has been referred to the [facility] website at [email or web address] to review the yearly Consent to Treat document.    Patient understands and accepts financial responsibility for any deductible, co-insurance and/or co-pays associated with this service.    Duration of the service: 22 minutes    Chief Complaint   Patient presents with    Follow - Up     Several clarifications on health history.      Summary of topics discussed:   HPI: Adilene presents today for follow-up on several clarifications/updates on health history.  She and I had an excellent first meeting last week where we discussed a lot of her health history from the past, but more importantly we focused our discussion on health opportunities now and into the future.    History of TIA x 2.  We discussed whether she should be seen by my colleague Dr. Beaver from neurology sooner than her scheduled visit on 6/2/2025.  As I mentioned in my previous note, Adilene was assessed by Dr. Moon from [facility] back on 9/17/2024.  She remains on statin and aspirin for secondary prevention measures.  Taking everything into account, we can safely say that she can keep her appointment with Dr. Beaver on 6/2/2025 and that there is no need to move this appointment up.  Asthma, mild intermittent and without complication.  She does have a current prescription of  levalbuterol in her possession.  It is not , and she does not need a refill at this time.  Vocal cord disorder.  She was assessed by Dr. Bonilla as written in the medical record.  The steroid that she received was in the oral form and administered as a tapering dose.  She did not receive a steroid injection by Dr. Bonilla.  Bilateral nephrolithiasis and idiopathic hypercalciuria.  As written in the medical record, she has been working with my colleague Dr. Meléndez long-term.  There was concerns about an abnormal finding at the [facility].  The medical record does show that she did undergo cystoscopy on 2024.  Per Dr. Meléndez's documentation, there were no abnormal findings on cystoscopy on that date.  The plan does still exist to have an updated kidney bladder ultrasound fairly soon.  It is noted that she has an appointment with Dr. Meléndez set for 3/25/2025.  Functional GI disorder and history of colon polyps.  I did inform her that I reached out to Dr. Burks and that we may choose to move in another direction, however, she should still keep the virtual appointment that she has set up with him on 2025.  We also discussed that we want to get some results back from SIBO and food sensitivity testing.  After that, I will likely transition her over to my colleague Dr. Bernard.  Her most recent colonoscopy was dated 7/10/2020 during which a single, 3 mm hyperplastic polyp was removed from the sigmoid colon.  This procedure was performed by Dr. Houser.  She should have a colonoscopy at the 3-year ronnie which will be this summer.  Strong family history of coronary artery disease in multiple first-degree relatives.  She also has known history of hypercholesterolemia and elevated lipoprotein(a) as previously described.  Her cardiologist is Dr. Luz.  Most recent stress test was back in May 2024 as stated previously in the medical record.  There was concern for calcification in the thoracic aorta, but I am unable to find the  specific date of which this may have been stated.  I did look through multiple records to look for the specific words going back to 2019 but I am unable to find this specific statement by radiology.  It is noted that she underwent a CT heart scan protocol on 7/12/2019 and she had a coronary artery calcium score of 0.  Moving forward, she wants to create more awareness that because of the strong family history that we cannot assume that she has 0 risk and that if any CVD symptoms were to arise, then they should be immediately explored.    Review of Systems   All other systems reviewed and are negative.    Patient Active Problem List   Diagnosis    ARPIT (generalized anxiety disorder)    Adenomatous polyp of colon, unspecified part of colon    History of colon polyps    Benign cyst of right kidney    Osteoporosis    Chronic rhinosinusitis    Angiomyolipoma of left kidney    Low-tension glaucoma of both eyes, severe stage    Mild intermittent asthma without complication (HCC)    History of TIA (transient ischemic attack) x 2    Age-related nuclear cataract of both eyes    Pure hypercholesterolemia    Chronic pericardial effusion (HCC)    Idiopathic hypercalciuria    Lumbar spondylosis    Levoscoliosis of thoracolumbar spine    Prediabetes    Dry eye syndrome of both eyes    Neurogenic cough, chronic    Bilateral carpal tunnel syndrome    Functional gastrointestinal disorder    Bilateral, nonobstructive, chronic nephrolithiasis    Disorder of vocal cord    Elevated lipoprotein(a)    Other specified dermatitis (Eyelid dermatitis)    Family history of heart disease       Isovue [isovue-m], Desipramine hcl, Omeprazole, and Prednisone      Current Outpatient Medications:     atorvastatin 20 MG Oral Tab, , Disp: , Rfl:     Sucralfate (CARAFATE OR), 1 every 3 days, Disp: , Rfl:     Omega-3 Fatty Acids (OMEGA 3 500 OR), Take 500 mg by mouth As Directed., Disp: , Rfl:     Azelastine HCl 137 MCG/SPRAY Nasal Solution, , Disp: ,  Rfl:     gabapentin 100 MG Oral Cap, Take 1 capsule (100 mg total) by mouth daily., Disp: , Rfl:     hydroCHLOROthiazide 12.5 MG Oral Tab, Take 1 tablet (12.5 mg total) by mouth every morning., Disp: , Rfl:     aspirin 81 MG Oral Tab EC, Take 1 tablet (81 mg total) by mouth every 3 (three) days., Disp: , Rfl:     cycloSPORINE (RESTASIS OP), Apply 1 drop to eye. Each eye, Disp: , Rfl:     FAMOTIDINE 40 MG Oral Tab, TAKE 1 TABLET BY MOUTH EVERY DAY, Disp: 90 tablet, Rfl: 0    Eyelid Cleansers (STERILID EX), , Disp: , Rfl:     Probiotic Product (PROBIOTIC DAILY OR), Take by mouth., Disp: , Rfl:     Calcium Citrate 200 MG Oral Tab, 1 tablet., Disp: , Rfl:     Cholecalciferol (VITAMIN D3) 2000 units Oral Tab, Take 1 capsule by mouth.  , Disp: , Rfl:     Social History     Socioeconomic History    Marital status:    Tobacco Use    Smoking status: Never    Smokeless tobacco: Never   Vaping Use    Vaping status: Never Used   Substance and Sexual Activity    Alcohol use: No     Alcohol/week: 0.0 standard drinks of alcohol    Drug use: No    Sexual activity: Never     Partners: Male   Other Topics Concern    Caffeine Concern No    Exercise Yes     Comment: lots   Social History Narrative    : 1971    Children: 3    Exercise: walks, [place] less so,     Employment: retired     Caffeine intake: minimal           PE:  Alert and oriented x 3, NAD.  She is not short of breath and speaks in full sentences.  Her mood is appropriate.      Assessment and plan:  Encounter Diagnoses   Name Primary?    History of TIA (transient ischemic attack) x 2 Yes    Mild intermittent asthma without complication (HCC)     Disorder of vocal cord     Bilateral, nonobstructive, chronic nephrolithiasis     Idiopathic hypercalciuria     Functional gastrointestinal disorder     History of colon polyps     Family history of heart disease     Pure hypercholesterolemia      History of TIA x 2.  We discussed  whether she should be seen by my colleague Dr. Beaver from neurology sooner than her scheduled visit on 2025.  As I mentioned in my previous note, Adilene was assessed by Dr. Moon from [facility] back on 2024.  She remains on statin and aspirin for secondary prevention measures.  Taking everything into account, we can safely say that she can keep her appointment with Dr. Beaver on 2025 and that there is no need to move this appointment up.  Asthma, mild intermittent and without complication.  She does have a current prescription of levalbuterol in her possession.  It is not , and she does not need a refill at this time.  Vocal cord disorder.  She was assessed by Dr. Bonilla as written in the medical record.  The steroid that she received was in the oral form and administered as a tapering dose.  She did not receive a steroid injection by Dr. Bonilla.  Bilateral nephrolithiasis and idiopathic hypercalciuria.  As written in the medical record, she has been working with my colleague Dr. Meléndez long-term.  There was concerns about an abnormal finding at the [facility].  The medical record does show that she did undergo cystoscopy on 2024.  Per Dr. Meléndez's documentation, there were no abnormal findings on cystoscopy on that date.  The plan does still exist to have an updated kidney bladder ultrasound fairly soon.  It is noted that she has an appointment with Dr. Meléndez set for 3/25/2025.  Functional GI disorder and history of colon polyps.  I did inform her that I reached out to Dr. Burks and that we may choose to move in another direction, however, she should still keep the virtual appointment that she has set up with him on 2025.  We also discussed that we want to get some results back from SIBO and food sensitivity testing.  After that, I will likely transition her over to my colleague Dr. Bernard.  Her most recent colonoscopy was dated 7/10/2020 during which a single, 3 mm hyperplastic polyp  was removed from the sigmoid colon.  This procedure was performed by Dr. Houser.  She should have a colonoscopy at the 3-year ronnie which will be this summer.  Strong family history of coronary artery disease in multiple first-degree relatives.  She also has known history of hypercholesterolemia and elevated lipoprotein(a) as previously described.  Her cardiologist is Dr. Luz.  Most recent stress test was back in May 2024 as stated previously in the medical record.  There was concern for calcification in the thoracic aorta, but I am unable to find the specific date of which this may have been stated.  I did look through multiple records to look for the specific words going back to 2019 but I am unable to find this specific statement by radiology.  It is noted that she underwent a CT heart scan protocol on 7/12/2019 and she had a coronary artery calcium score of 0.  Moving forward, she wants to create more awareness that because of the strong family history that we cannot assume that she has 0 risk and that if any CVD symptoms were to arise, then they should be immediately explored.  Pertinent updates in this note supersede previous medical documentation where specified from the office visit dated 2/6/2025, where applicable.  She verbally agrees to and understands the plan as outlined above.  She was also afforded the time and opportunity to ask questions, which were then answered to the best of my ability.          Ronnie Hines MD  [facility] Medicine Physician  Diplomate, American Board of Internal Medicine  Member, American College of Lifestyle Medicine  Member, American Association for Physician Leadership  [address], [address] [address]  [phone] (phone); [phone] (fax)  [email or web address]         Meds & Refills for this Visit:  Requested Prescriptions      No prescriptions requested or ordered in this encounter       Imaging &  Consults:  None

## 2025-02-11 PROBLEM — I70.0 THORACIC AORTA ATHEROSCLEROSIS: Status: ACTIVE | Noted: 2025-02-11

## 2025-02-13 ENCOUNTER — NURSE ONLY (OUTPATIENT)
Dept: INTERNAL MEDICINE CLINIC | Facility: CLINIC | Age: 75
End: 2025-02-13
Payer: MEDICARE

## 2025-02-13 ENCOUNTER — LAB ENCOUNTER (OUTPATIENT)
Dept: LAB | Age: 75
End: 2025-02-13
Attending: INTERNAL MEDICINE
Payer: MEDICARE

## 2025-02-13 DIAGNOSIS — Z00.00 LABORATORY EXAM ORDERED AS PART OF ROUTINE GENERAL MEDICAL EXAMINATION: Primary | ICD-10-CM

## 2025-02-13 DIAGNOSIS — R69 DIAGNOSIS UNKNOWN: Primary | ICD-10-CM

## 2025-02-13 LAB
BILIRUB UR QL STRIP.AUTO: NEGATIVE
CLARITY UR REFRACT.AUTO: CLEAR
GLUCOSE UR STRIP.AUTO-MCNC: NORMAL MG/DL
KETONES UR STRIP.AUTO-MCNC: NEGATIVE MG/DL
LEUKOCYTE ESTERASE UR QL STRIP.AUTO: 75
NITRITE UR QL STRIP.AUTO: NEGATIVE
PH UR STRIP.AUTO: 7.5 [PH] (ref 5–8)
PROT UR STRIP.AUTO-MCNC: NEGATIVE MG/DL
RBC UR QL AUTO: NEGATIVE
SP GR UR STRIP.AUTO: 1.01 (ref 1–1.03)
UROBILINOGEN UR STRIP.AUTO-MCNC: NORMAL MG/DL

## 2025-02-13 PROCEDURE — 81001 URINALYSIS AUTO W/SCOPE: CPT | Performed by: INTERNAL MEDICINE

## 2025-02-17 ENCOUNTER — TELEPHONE (OUTPATIENT)
Dept: INTERNAL MEDICINE CLINIC | Facility: CLINIC | Age: 75
End: 2025-02-17

## 2025-02-17 DIAGNOSIS — R82.90 ABNORMAL URINALYSIS: Primary | ICD-10-CM

## 2025-02-17 NOTE — TELEPHONE ENCOUNTER
Called Patient back and informed of ordered Urine tests, verbalized understanding  Plans to complete tomorrow

## 2025-02-17 NOTE — TELEPHONE ENCOUNTER
Please call Adilene and have her do the urine sample tomorrow.       Lam Hines MD  [facility] Physician  Diplomate, American Board of Internal Medicine  Member, American College of Lifestyle Medicine  Member, American Association for Physician [address], [facility] [address], [place] [address]  [phone] (phone); [phone] (fax)  [email or web address]

## 2025-02-17 NOTE — TELEPHONE ENCOUNTER
TC to Patient ([phone])     Since yesterday, reports feeling like she needs to urinate but nothing happens  She states she is surprised by this as she takes a diuretic     She is flying to [place] Wednesday morning and doesn't want to have to look for an urgent care while there    Has been drinking a lot of water, also drinking some diluted cranberry juice    Reports some intermittent L back pain but states this is a chronic issue   Denies hematuria, burning, odor, cloudiness, fever, body aches, chills    She had a virtual visit 02/10/2025, completed UA 02/13/2025  An order for UA with Culture reflex was placed 02/14/2025 to be done in 1 month, but Patient would like to do this prior to her Wed trip to ensure there is no infection    Please advise

## 2025-02-17 NOTE — TELEPHONE ENCOUNTER
Patient is experiencing bladder pressure and would like to repeat her urinalysis tomorrow in the morning. Patient would like to speak with the nurse.

## 2025-02-18 ENCOUNTER — LAB ENCOUNTER (OUTPATIENT)
Dept: LAB | Age: 75
End: 2025-02-18
Attending: INTERNAL MEDICINE
Payer: MEDICARE

## 2025-02-18 DIAGNOSIS — R82.90 ABNORMAL URINALYSIS: ICD-10-CM

## 2025-02-18 LAB
BILIRUB UR QL STRIP.AUTO: NEGATIVE
CLARITY UR REFRACT.AUTO: CLEAR
COLOR UR AUTO: COLORLESS
GLUCOSE UR STRIP.AUTO-MCNC: NORMAL MG/DL
KETONES UR STRIP.AUTO-MCNC: NEGATIVE MG/DL
LEUKOCYTE ESTERASE UR QL STRIP.AUTO: NEGATIVE
NITRITE UR QL STRIP.AUTO: NEGATIVE
PH UR STRIP.AUTO: 7 [PH] (ref 5–8)
PROT UR STRIP.AUTO-MCNC: NEGATIVE MG/DL
RBC UR QL AUTO: NEGATIVE
SP GR UR STRIP.AUTO: 1.01 (ref 1–1.03)
UROBILINOGEN UR STRIP.AUTO-MCNC: NORMAL MG/DL

## 2025-02-18 PROCEDURE — 87186 SC STD MICRODIL/AGAR DIL: CPT

## 2025-02-18 PROCEDURE — 87077 CULTURE AEROBIC IDENTIFY: CPT

## 2025-02-18 PROCEDURE — 87086 URINE CULTURE/COLONY COUNT: CPT

## 2025-02-18 PROCEDURE — 81003 URINALYSIS AUTO W/O SCOPE: CPT

## 2025-02-20 RX ORDER — NITROFURANTOIN 25; 75 MG/1; MG/1
100 CAPSULE ORAL 2 TIMES DAILY
Qty: 14 CAPSULE | Refills: 0 | Status: SHIPPED | OUTPATIENT
Start: 2025-02-20

## 2025-02-20 NOTE — PROGRESS NOTES
Script written for [employer] and sent to pharmacy in [place].       Lam Hines MD  [facility] Physician  Diplomate, American Board of Internal Medicine  Member, American College of Lifestyle Medicine  Member, American Association for Physician Leadership  [address], [facility] [address], [place] [address]  [phone] (phone); [phone] (fax)  [email or web address]

## 2025-02-28 ENCOUNTER — NURSE TRIAGE (OUTPATIENT)
Dept: INTERNAL MEDICINE CLINIC | Facility: CLINIC | Age: 75
End: 2025-02-28

## 2025-02-28 NOTE — TELEPHONE ENCOUNTER
Dr Holt advised the following:    Yes she IS a diabetic and the high sugars are causing her sx, can drop UACS today but suspect UTI gone. What is she on for DM/ Of course strict diabetic diet will help. May take couple weeks for body to adjust. Can fu DrG next week.    [phone]   Spoke with patient  Advised of provider comments and recommendations.  States that sugars have never been this high before.  Is not taking any medication for her blood sugar.  Does not feel Urine test is needed.  Appointment scheduled.    Future Appointments   Date Time Provider Department Center   3/3/2025  9:30 AM Lam Hines MD [facility] 24 [facility] [place]   3/10/2025 10:00 AM [facility] 24 [facility] [place]   3/24/2025  3:45 PM Andrew Raymond MD [facility] [place]   6/2/2025  9:15 AM Carlita Beaver DO [facility] [place]

## 2025-02-28 NOTE — TELEPHONE ENCOUNTER
Patient calling in  States that she was advised to call in with blood sugars for PCP.  [phone], [phone], [phone] from [phone] while fasting.  States that she is shaky, dizzy, stiff feeling, chills.  Feeling very thirsty.  Took last antibiotics today for her Urinary Tract Infection.  States that she had difficulty walking like she had lost strength.  Asking if she should continue tracking blood sugars, if she could be fully Diabetic now, and if the antibiotics could cause these symptoms?    Provider please advise

## 2025-02-28 NOTE — TELEPHONE ENCOUNTER
Agreed that this looks like new-onset diabetes.  Last A1c was 5.7% back on 10/11/24.  We'll get a spot A1c on Monday here in the office and consider ordering more tests as well.       Lam Hines MD  [facility] Physician  Diplomate, American Board of Internal Medicine  Member, American College of Lifestyle Medicine  Member, American Association for Physician [address], [facility] [address] [address] [phone] (phone); [phone] (fax)  [email or web address]

## 2025-03-03 ENCOUNTER — OFFICE VISIT (OUTPATIENT)
Dept: INTERNAL MEDICINE CLINIC | Facility: CLINIC | Age: 75
End: 2025-03-03
Payer: MEDICARE

## 2025-03-03 VITALS
SYSTOLIC BLOOD PRESSURE: 120 MMHG | RESPIRATION RATE: 16 BRPM | HEART RATE: 79 BPM | OXYGEN SATURATION: 99 % | HEIGHT: 59 IN | DIASTOLIC BLOOD PRESSURE: 60 MMHG | BODY MASS INDEX: 21.61 KG/M2 | WEIGHT: 107.19 LBS | TEMPERATURE: 98 F

## 2025-03-03 DIAGNOSIS — R25.1 SHAKING: ICD-10-CM

## 2025-03-03 DIAGNOSIS — K92.9 FUNCTIONAL GASTROINTESTINAL DISORDER: ICD-10-CM

## 2025-03-03 DIAGNOSIS — B96.20 E. COLI UTI: ICD-10-CM

## 2025-03-03 DIAGNOSIS — T78.1XXA GASTROINTESTINAL FOOD SENSITIVITY: ICD-10-CM

## 2025-03-03 DIAGNOSIS — R42 DIZZY: ICD-10-CM

## 2025-03-03 DIAGNOSIS — R73.9 HYPERGLYCEMIA: Primary | ICD-10-CM

## 2025-03-03 DIAGNOSIS — N39.0 E. COLI UTI: ICD-10-CM

## 2025-03-03 DIAGNOSIS — R73.03 PREDIABETES: ICD-10-CM

## 2025-03-03 LAB
ALBUMIN SERPL-MCNC: 4.5 G/DL (ref 3.2–4.8)
ALBUMIN/GLOB SERPL: 1.8 {RATIO} (ref 1–2)
ALP LIVER SERPL-CCNC: 42 U/L
ALT SERPL-CCNC: 28 U/L
ANION GAP SERPL CALC-SCNC: 3 MMOL/L (ref 0–18)
AST SERPL-CCNC: 30 U/L (ref ?–34)
BASOPHILS # BLD AUTO: 0.01 X10(3) UL (ref 0–0.2)
BASOPHILS NFR BLD AUTO: 0.2 %
BILIRUB SERPL-MCNC: 0.5 MG/DL (ref 0.2–1.1)
BILIRUB UR QL STRIP.AUTO: NEGATIVE
BUN BLD-MCNC: 16 MG/DL (ref 9–23)
CALCIUM BLD-MCNC: 9.2 MG/DL (ref 8.7–10.6)
CHLORIDE SERPL-SCNC: 97 MMOL/L (ref 98–112)
CLARITY UR REFRACT.AUTO: CLEAR
CO2 SERPL-SCNC: 35 MMOL/L (ref 21–32)
CREAT BLD-MCNC: 0.6 MG/DL
EGFRCR SERPLBLD CKD-EPI 2021: 93 ML/MIN/1.73M2 (ref 60–?)
EOSINOPHIL # BLD AUTO: 0.17 X10(3) UL (ref 0–0.7)
EOSINOPHIL NFR BLD AUTO: 4.1 %
ERYTHROCYTE [DISTWIDTH] IN BLOOD BY AUTOMATED COUNT: 13.7 %
EST. AVERAGE GLUCOSE BLD GHB EST-MCNC: 134 MG/DL (ref 68–126)
FASTING STATUS PATIENT QL REPORTED: YES
GLOBULIN PLAS-MCNC: 2.5 G/DL (ref 2–3.5)
GLUCOSE BLD-MCNC: 106 MG/DL (ref 70–99)
GLUCOSE BLOOD: 85
GLUCOSE UR STRIP.AUTO-MCNC: NORMAL MG/DL
HBA1C MFR BLD: 6.3 % (ref ?–5.7)
HCT VFR BLD AUTO: 41.3 %
HEMOGLOBIN A1C: 6.3 % (ref 4.3–5.6)
HGB BLD-MCNC: 13.6 G/DL
IMM GRANULOCYTES # BLD AUTO: 0 X10(3) UL (ref 0–1)
IMM GRANULOCYTES NFR BLD: 0 %
KETONES UR STRIP.AUTO-MCNC: 40 MG/DL
LEUKOCYTE ESTERASE UR QL STRIP.AUTO: NEGATIVE
LYMPHOCYTES # BLD AUTO: 1.17 X10(3) UL (ref 1–4)
LYMPHOCYTES NFR BLD AUTO: 28.1 %
MCH RBC QN AUTO: 28.2 PG (ref 26–34)
MCHC RBC AUTO-ENTMCNC: 32.9 G/DL (ref 31–37)
MCV RBC AUTO: 85.5 FL
MONOCYTES # BLD AUTO: 0.54 X10(3) UL (ref 0.1–1)
MONOCYTES NFR BLD AUTO: 12.9 %
NEUTROPHILS # BLD AUTO: 2.28 X10 (3) UL (ref 1.5–7.7)
NEUTROPHILS # BLD AUTO: 2.28 X10(3) UL (ref 1.5–7.7)
NEUTROPHILS NFR BLD AUTO: 54.7 %
NITRITE UR QL STRIP.AUTO: NEGATIVE
OSMOLALITY SERPL CALC.SUM OF ELEC: 282 MOSM/KG (ref 275–295)
PH UR STRIP.AUTO: 6.5 [PH] (ref 5–8)
PLATELET # BLD AUTO: 251 10(3)UL (ref 150–450)
POTASSIUM SERPL-SCNC: 3.2 MMOL/L (ref 3.5–5.1)
PROT SERPL-MCNC: 7 G/DL (ref 5.7–8.2)
PROT UR STRIP.AUTO-MCNC: NEGATIVE MG/DL
RBC # BLD AUTO: 4.83 X10(6)UL
RBC UR QL AUTO: NEGATIVE
SODIUM SERPL-SCNC: 135 MMOL/L (ref 136–145)
SP GR UR STRIP.AUTO: 1.01 (ref 1–1.03)
TEST STRIP LOT #: NORMAL NUMERIC
UROBILINOGEN UR STRIP.AUTO-MCNC: NORMAL MG/DL
WBC # BLD AUTO: 4.2 X10(3) UL (ref 4–11)

## 2025-03-03 PROCEDURE — 87186 SC STD MICRODIL/AGAR DIL: CPT | Performed by: INTERNAL MEDICINE

## 2025-03-03 PROCEDURE — 85025 COMPLETE CBC W/AUTO DIFF WBC: CPT | Performed by: INTERNAL MEDICINE

## 2025-03-03 PROCEDURE — 87086 URINE CULTURE/COLONY COUNT: CPT | Performed by: INTERNAL MEDICINE

## 2025-03-03 PROCEDURE — 87077 CULTURE AEROBIC IDENTIFY: CPT | Performed by: INTERNAL MEDICINE

## 2025-03-03 PROCEDURE — 80053 COMPREHEN METABOLIC PANEL: CPT | Performed by: INTERNAL MEDICINE

## 2025-03-03 PROCEDURE — 83036 HEMOGLOBIN GLYCOSYLATED A1C: CPT | Performed by: INTERNAL MEDICINE

## 2025-03-03 PROCEDURE — 81003 URINALYSIS AUTO W/O SCOPE: CPT | Performed by: INTERNAL MEDICINE

## 2025-03-03 NOTE — PROGRESS NOTES
The 21st Century Cures Act makes medical notes like these available to patients in the interest of transparency. Please be advised this is a medical document. Medical documents are intended to carry relevant information, facts as evident, and the clinical opinion of the practitioner. The medical note is intended as peer to peer communication and may appear blunt or direct. It is written in medical language and may contain abbreviations or verbiage that are unfamiliar.           Chief Complaint   Patient presents with    Blood Sugar     Elevated blood sugar           HPI: Adilene presents today for primarily for hyperglycemia.  She recently returned from [place] approximately 1 week ago from today.  She'd been out there that previous week visiting family, travel dates 2/19/25 thru 2/24/25.  She does have a history of prediabetes with last A1c 5.7% resulted on 10/11/24.  Near the end of her [place] trip, she started to feel unwell.  On 2/23/24, she had a normal day in [place].  Walked about 7.5 miles comfortably.  Got back to her hotel around 8 PM.  At 8:20 PM she started to get dizzy and shaking.  No other symptoms.  Shaking got worse throughout the evening.  Lasted until approximately 1 AM.  Her [employer] indicated sinus tachycardia that settled down.  No atrial fibrillation.  She chewed 4 baby aspirins.  She did have a previous history of TIA years ago that began somewhat in a similar manner.  She felt much better in the morning and caught her flight back to [place].  She does have access to a glucometer at home.  She shares the following glucose metrics with me today:    2/25/25 - 87  2/28/25 - 159, 204, 169  3/1/25 - 142  3/2/25 - 95, 100, 107    She spoke with my staff on Friday 2/28/25.  My partner Dr. Holt was covering for me as I was out of the office.  Per nursing notes, she was still feeling somewhat shaky and dizzy.  There were some chills and feeling very thirsty.  There was purported difficulty walking  like she had lost strength.  She had inquired if she could be fully diabetic and if previous prescribed antibiotics could cause some of her symptoms.      She did have an E.coli UTI that was finalized on 2/20/25, after giving us a urine sample on 2/18/25.  She notified us on 2/17/25 via a telephone call with my nurse that she began having the sensation of needing to urinate but nothing happened, which began on 2/16/25.  Ultimately, her urine sample was formally analyzed.  She did receive a script for Nitrofurantoin on 2/20/25 at 1 tablet PO BID x 7 days.    When I ask how she's doing today, she does believe that over the last 48 hours that she is moving in the right direction.    She's also here to discuss food sensitivity testing, done thru [facility].  Collection date was on 2/13/25 and report was finalized on 2/17/25.  The reason for testing was for the diagnosis of functional GI disorder.  Of note, her SIBO test result is not yet back.      \"High Risk\" foods were as follows:  Beta-Casein IgA 21 and IgG 14  Cucumber IgG 25  Vanilla Bean IgG 25  Celery IgG 22  White Potato IgG 29    \"Moderate Risk\" foods were as follows:  Casomorphin IgG 15  [place] IgG 11  Clam IgG 14  Garlic IgG 12  Squash IgG 11  Apple IgG 16  Blueberry IgG 11  Grapefruit IgG 11  Watermelon IgG 11  Cows milk IgA 11 and IgG 17  Perch IgG 11  Carrot IgG 16  Green bean IgG 12  Limited IgG 12  Apricot IgG 19  Cantaloupe IgG 14  Pineapple IgG 15  English walnut IgG 11      Review of Systems   All other systems reviewed and are negative.    Patient Active Problem List   Diagnosis    ARPIT (generalized anxiety disorder)    Adenomatous polyp of colon, unspecified part of colon    History of colon polyps    Benign cyst of right kidney    Osteoporosis    Chronic rhinosinusitis    Angiomyolipoma of left kidney    Low-tension glaucoma of both eyes, severe stage    Mild intermittent asthma without complication (HCC)    History of TIA (transient  ischemic attack) x 2    Age-related nuclear cataract of both eyes    Pure hypercholesterolemia    Chronic pericardial effusion (HCC)    Idiopathic hypercalciuria    Lumbar spondylosis    Levoscoliosis of thoracolumbar spine    Prediabetes    Dry eye syndrome of both eyes    Neurogenic cough, chronic    Bilateral carpal tunnel syndrome    Functional gastrointestinal disorder    Bilateral, nonobstructive, chronic nephrolithiasis    Disorder of vocal cord    Elevated lipoprotein(a)    Other specified dermatitis (Eyelid dermatitis)    Family history of heart disease    Thoracic aorta atherosclerosis - seen on 12/26/24 Chest X-ray       Isovue [isovue-m], Desipramine hcl, Omeprazole, and Prednisone      Current Outpatient Medications:     atorvastatin 20 MG Oral Tab, , Disp: , Rfl:     Sucralfate (CARAFATE OR), 1 every 3 days, Disp: , Rfl:     Omega-3 Fatty Acids (OMEGA 3 500 OR), Take 500 mg by mouth As Directed., Disp: , Rfl:     gabapentin 100 MG Oral Cap, Take 1 capsule (100 mg total) by mouth daily., Disp: , Rfl:     hydroCHLOROthiazide 12.5 MG Oral Tab, Take 1 tablet (12.5 mg total) by mouth every morning., Disp: , Rfl:     aspirin 81 MG Oral Tab EC, Take 1 tablet (81 mg total) by mouth every 3 (three) days., Disp: , Rfl:     cycloSPORINE (RESTASIS OP), Apply 1 drop to eye. Each eye, Disp: , Rfl:     FAMOTIDINE 40 MG Oral Tab, TAKE 1 TABLET BY MOUTH EVERY DAY, Disp: 90 tablet, Rfl: 0    Eyelid Cleansers (STERILID EX), , Disp: , Rfl:     Probiotic Product (PROBIOTIC DAILY OR), Take by mouth., Disp: , Rfl:     Calcium Citrate 200 MG Oral Tab, 1 tablet., Disp: , Rfl:     Cholecalciferol (VITAMIN D3) 2000 units Oral Tab, Take 1 capsule by mouth.  , Disp: , Rfl:     Azelastine HCl 137 MCG/SPRAY Nasal Solution, , Disp: , Rfl:     Social History     Socioeconomic History    Marital status:    Tobacco Use    Smoking status: Never    Smokeless tobacco: Never   Vaping Use    Vaping status: Never Used   Substance and  Sexual Activity    Alcohol use: No     Alcohol/week: 0.0 standard drinks of alcohol    Drug use: No    Sexual activity: Never     Partners: Male   Other Topics Concern    Caffeine Concern No    Exercise Yes     Comment: lots   Social History Narrative    : 1971    Children: 3    Exercise: walks, [place] less so,     Employment: retired     Caffeine intake: minimal           PE:  /60 (BP Location: Left arm, Patient Position: Sitting, Cuff Size: adult)   Pulse 79   Temp 97.8 °F (36.6 °C) (Temporal)   Resp 16   Ht 4' 11\" (1.499 m)   Wt 107 lb 3.2 oz (48.6 kg)   SpO2 99%   BMI 21.65 kg/m²   Gen - A&Ox3, NAD  Lungs - CTAB  CV - RRR, nl s1, s1, no M  Abd - soft, NABS, NT, ND, no HSM, no B CVAT  Psych - nl mood/affect      Labs:  Component      Latest Ref Rng 3/3/2025   GLUCOSE BLOOD 85    Test Strip Lot #      Numeric [id number]    Test Strip Expiration Date      Date 06/07/25    HEMOGLOBIN A1C      4.3 - 5.6 % 6.3 !    Cartridge Lot#      Numeric [id number]    Cartridge Expiration Date      Date 10/18/26       Legend:  ! Abnormal      A/P:  Encounter Diagnoses   Name Primary?    Hyperglycemia Yes    Prediabetes     Dizzy     Shaking     E. coli UTI     Gastrointestinal food sensitivity     Functional gastrointestinal disorder      1. Hyperglycemia and worsening prediabetes.  Random glucose and A1c as above.  We spoke in the past about using real-time glucose monitoring with the Lingo device from [employer].  She will get this ordered soon.  2.  Dizzy and shaking episodes.  It is not entirely clear-cut what was driving these subjective symptoms.  It may be impossible to exclude whether she suffered from a TIA on that Sunday evening while she was in [place] or another metabolic derangement, because such diagnoses can be so nuanced without clinical data in that exact situation (I.e., presenting to a local ER to run clinical labs/diagnostics/etc.).  Most importantly, she is on  the mend toward feeling better and approaching back to her baseline.  I did order a CBC and a CMP at this time.  3. E. Coli UTI.  She did complete antibiotic treatment.  Send for urine testing to document resolution.  4. GI food sensitivity and functional GI disorder.  Food sensitivity results as above.  We spoke about an elimination diet.  She can eliminate most of the foods in the \"high risk\" category.  Beta-casein may be a bit more of a challenge.  I would like her to work with a GI nutritionist.  I would like her to start working with my colleagues over at [facility].  I have asked my clinical staff to help support this and provide any relevant material to the dietitian team.  I also asked my team to verify if this group serves Medicare patients.  5. RTC as needed or at next regularly scheduled office visit.  She verbally agrees to and understands the plan as outlined above.  She was also afforded the time and opportunity to ask questions, which were then answered to the best of my ability.        Lam Hines MD  [facility] Physician  Diplomate, American Board of Internal Medicine  Member, American College of Lifestyle Medicine  Member, American Association for Physician Leadership  [address], [address], [place] [address]  [phone] (phone); [phone] (fax)  [email or web address]         Orders Placed This Encounter   Procedures    ASSAY QUANTITATIVE, GLUCOSE    POC Hemoglobin A1C    Comp Metabolic Panel (14) [E]    CBC With Differential With Platelet    Urinalysis, Routine [E]    Urine Culture, Routine [E]       Meds & Refills for this Visit:  Requested Prescriptions      No prescriptions requested or ordered in this encounter       Imaging & Consults:  None

## 2025-03-04 ENCOUNTER — TELEPHONE (OUTPATIENT)
Facility: CLINIC | Age: 75
End: 2025-03-04

## 2025-03-04 NOTE — TELEPHONE ENCOUNTER
Lam Hines MD  [facility] Clinical Staff  Please follow up with [facility] on referral protocol for functional GI disorder and GI food sensitivity.  I'd like to send her to this group for evaluation.    Dr. MADRIGAL    Called 3/3/25 and left voice message to get the order forms, also on the message if they take medicare.

## 2025-03-05 ENCOUNTER — PATIENT MESSAGE (OUTPATIENT)
Facility: CLINIC | Age: 75
End: 2025-03-05

## 2025-03-06 RX ORDER — NITROFURANTOIN 25; 75 MG/1; MG/1
100 CAPSULE ORAL 2 TIMES DAILY
Qty: 14 CAPSULE | Refills: 0 | Status: SHIPPED | OUTPATIENT
Start: 2025-03-06 | End: 2025-03-07 | Stop reason: ALTCHOICE

## 2025-03-06 NOTE — TELEPHONE ENCOUNTER
Another round of nitrofurantoin sent to her pharmacy for residual Enterococcus UTI.  Patient notified via [employer] and she will do this for 7 days.       Lam Hines MD  [facility] Physician  Diplomate, American Board of Internal Medicine  Member, American College of Lifestyle Medicine  Member, American Association for Physician Leadership  [address], [facility] [address] [address]  [phone] (phone); [phone] (fax)  [email or web address]

## 2025-03-07 ENCOUNTER — TELEPHONE (OUTPATIENT)
Facility: CLINIC | Age: 75
End: 2025-03-07

## 2025-03-07 DIAGNOSIS — N10 ACUTE PYELONEPHRITIS: Primary | ICD-10-CM

## 2025-03-07 RX ORDER — LEVOFLOXACIN 750 MG/1
750 TABLET, FILM COATED ORAL DAILY
Qty: 5 TABLET | Refills: 0 | Status: SHIPPED | OUTPATIENT
Start: 2025-03-07

## 2025-03-07 NOTE — TELEPHONE ENCOUNTER
Patient is on her second round of antibiotics for a UTI. She took her medication last night after dinner and began throwing up and was up most of the night. Patient is concerned the infection might be migrating to her kidney because she is now have pain in her side.   Please call to discuss.

## 2025-03-07 NOTE — TELEPHONE ENCOUNTER
New script for Nitrofurantoin 100 mg bid x7 days ordered to address continued UTI    Called Patient's spouse Deni, but call went straight to     Called Patient's mobile, her spouse Jonah answered    Patient took her abx around 6:30 pm. Emesis started about 2 am, last episode was about 6 am  Denies diarrhea  Patient reports feeling a little constipated    Fever: 100.8 - axillary - taken about 20 minutes prior to call    +L flank pain, moderate, intermittent, describes as a dull ache  Present now. Area feels sensitive to touch but she doesn't think palpating the area worsens pain    +R flank pain - mild, but describes as throbbing    +nausea  States urine gets dark on the abx  Denies hematuria    Denies abd pain  States she felt some dizziness overnight, denies now    Discussed water/fluids including broths, gradually upgrading diet to bland/BRAT diet as tolerated    Patient states her UTI sx were gradually worsening before starting the new abx    They are concerned Patient's kidney might be affected by the UTI    Please advise

## 2025-03-07 NOTE — TELEPHONE ENCOUNTER
Patient's spouse Jonah called back to check on message. Patient is in background    Reviewed provider note  He is agreeable to new abx, will f/u with Pharmacy    He states Patient is scheduled to complete Bladder US with [facility] Dr. Meléndez this upcoming Tuesday. Encouraged Spouse to let office know once the test is done so office can look for results    Reviewed sx per Dr. Hines and encouraged they call on-call over week PRN    Patient and Spouse verbalized understanding

## 2025-03-07 NOTE — TELEPHONE ENCOUNTER
Please reach out to the patient.  I did get her message and thank her for letting me know about her symptoms.  Obviously, we will stop the nitrofurantoin.  Because there is concern for potential acute pyelonephritis, we need to start a much more robust antibiotic.  The current guidelines would say levofloxacin 750 mg 1 tablet daily x 5 days.  I have gone ahead and sent the prescription to her pharmacy.  Please let her know that I also curbside in my colleagues in radiology regarding whether or not she should be imaged.  Acute pyelonephritis can be seen on both CT scan and ultrasound.  CT scan would need to be done with contrast as it is the most sensitive exam, however, because she has a contrast dye adverse reaction history, the testing modality would be an ultrasound which would be sensitive.  I do not know how soon she would be able to get a ultrasound of we ordered it, but I will order it nonetheless.  The most important part is to treat for this potential diagnosis with a much more robust antibiotic as mentioned above.  We can always do the ultrasound down the line to get a picture of the kidneys.  We are going to do our best to try to stay out in front of this diagnosis.  I do not think that she needs to proceed to the ER at this point.  Red flags to look out for are as follows:    - Worsening pain at the flank.    - High fevers.    - Bloody urine.    - Persistent nausea and vomiting.    These might be signs that would suggest the evaluation in the emergency room and for potential need for IV therapies.       Lam Hines MD  [facility] Physician  Diplomate, American Board of Internal Medicine  Member, American College of Lifestyle Medicine  Member, American Association for Physician Leadership  [address], [address], [place], [place] [id number] [phone] (phone); [phone] (fax)  [email or web address]

## 2025-03-10 RX ORDER — HYDROCHLOROTHIAZIDE 12.5 MG/1
12.5 TABLET ORAL EVERY MORNING
Qty: 90 TABLET | Refills: 3 | Status: SHIPPED | OUTPATIENT
Start: 2025-03-10

## 2025-03-10 NOTE — TELEPHONE ENCOUNTER
Medication: hydrochlorothiazide 12.5 mg 1 tab daily filled     Last time it was filled Historical   Last office visit:  3/3/25  Due back to office:  RTC as needed or at next regularly scheduled office visit.   Future office visit:  No upcoming apt on file   Labs     Ref Range & Units 3/3/25 10:30 AM   Glucose  70 - 99 mg/dL 106 High    Sodium  136 - 145 mmol/L 135 Low    Potassium  3.5 - 5.1 mmol/L 3.2 Low    Chloride  98 - 112 mmol/L 97 Low    CO2  21.0 - 32.0 mmol/L 35.0 High    Anion Gap  0 - 18 mmol/L 3   BUN  9 - 23 mg/dL 16   Creatinine  0.55 - 1.02 mg/dL 0.60   Calcium, Total  8.7 - 10.6 mg/dL 9.2   Calculated Osmolality  275 - 295 mOsm/kg 282   eGFR-Cr  >=60 mL/min/1.73m2 93   AST  <34 U/L 30   ALT  10 - 49 U/L 28   Alkaline Phosphatase  55 - 142 U/L 42 Low

## 2025-03-12 ENCOUNTER — LAB ENCOUNTER (OUTPATIENT)
Dept: LAB | Age: 75
End: 2025-03-12
Attending: INTERNAL MEDICINE
Payer: MEDICARE

## 2025-03-12 DIAGNOSIS — B95.2 UTI (URINARY TRACT INFECTION) DUE TO ENTEROCOCCUS: ICD-10-CM

## 2025-03-12 DIAGNOSIS — N39.0 UTI (URINARY TRACT INFECTION) DUE TO ENTEROCOCCUS: ICD-10-CM

## 2025-03-12 LAB
BILIRUB UR QL STRIP.AUTO: NEGATIVE
CLARITY UR REFRACT.AUTO: CLEAR
GLUCOSE UR STRIP.AUTO-MCNC: NORMAL MG/DL
KETONES UR STRIP.AUTO-MCNC: NEGATIVE MG/DL
LEUKOCYTE ESTERASE UR QL STRIP.AUTO: NEGATIVE
NITRITE UR QL STRIP.AUTO: NEGATIVE
PH UR STRIP.AUTO: 7 [PH] (ref 5–8)
PROT UR STRIP.AUTO-MCNC: NEGATIVE MG/DL
RBC UR QL AUTO: NEGATIVE
SP GR UR STRIP.AUTO: 1.01 (ref 1–1.03)
UROBILINOGEN UR STRIP.AUTO-MCNC: NORMAL MG/DL

## 2025-03-12 PROCEDURE — 87086 URINE CULTURE/COLONY COUNT: CPT

## 2025-03-12 PROCEDURE — 81003 URINALYSIS AUTO W/O SCOPE: CPT

## 2025-03-24 ENCOUNTER — DOCUMENTATION ONLY (OUTPATIENT)
Facility: CLINIC | Age: 75
End: 2025-03-24

## 2025-03-24 ENCOUNTER — TELEPHONE (OUTPATIENT)
Facility: CLINIC | Age: 75
End: 2025-03-24

## 2025-04-01 RX ORDER — ATORVASTATIN CALCIUM 20 MG/1
20 TABLET, FILM COATED ORAL NIGHTLY
Qty: 90 TABLET | Refills: 3 | Status: ON HOLD | OUTPATIENT
Start: 2025-04-01

## 2025-04-01 NOTE — TELEPHONE ENCOUNTER
Requested Prescriptions     Pending Prescriptions Disp Refills    atorvastatin 20 MG Oral Tab 90 tablet 0     Sig: Take 1 tablet (20 mg total) by mouth nightly.       Last refill: never filled by PCP  In EMR as historical 20 mg   Last office visit: 03/03/2025 - elevated blood sugar  Established care: 02/06/2025  Return to clinic: 1-2 months  Future Appointments   Date Time Provider Department Center   6/2/2025  9:15 AM Carlita Beaver DO [facility]   9/2/2025 11:20 AM [facility] D[facility]A [facility]   3/30/2026  1:15 PM Andrew Raymond MD [facility]     Last lipid: 10/11/2024 - ordered by [facility] provider  Component      Latest Ref Rng 3/3/2025   AST (SGOT)      <34 U/L 30    ALT (SGPT)      10 - 49 U/L 28      Please advise

## 2025-04-01 NOTE — TELEPHONE ENCOUNTER
Patient is requesting a refill on her prescription for atorvastatin 20 MG Oral Tab 90 day supply that was previously prescribed by her old doctor.     [facility] [address] [place] [address] - [facility] KORY AZAR [phone], [phone]

## 2025-04-04 ENCOUNTER — TELEPHONE (OUTPATIENT)
Facility: CLINIC | Age: 75
End: 2025-04-04

## 2025-04-04 RX ORDER — CEPHALEXIN 500 MG/1
500 CAPSULE ORAL 2 TIMES DAILY
Qty: 14 CAPSULE | COMMUNITY
Start: 2025-04-04 | End: 2025-04-07 | Stop reason: CLARIF

## 2025-04-04 NOTE — TELEPHONE ENCOUNTER
Per Dr. Holt:  2nd opinion Uro Sarai or Kelly([facility] not [facility])-will take time to get in   See if culture pending can can do Rx Macrobid 100 bid for 1 week to start and today and G can fu next week       Called Dr. Meléndez's office ([phone]), was informed the urine cx is pending and patient was ordered Cephalexin 500 mg q 12 hrs x7 days while awaiting for the cx results. She states Patient was advised to be seen in ER if she develops any fevers      Called Patient back, discussed notes above.  She is planning to  the abx but isn't sure yet if she will start it as she is concerned about the affect all these abx have had on her gut biome.    She verbalized appreciation for the [facility] recommendations for a second opinion - phone # provided    Patient will f/u PRN    EMR updated    FYI

## 2025-04-04 NOTE — TELEPHONE ENCOUNTER
Patient has been dealing with a UTI since February. She has been taking antibiotics and has seen a gynecologist. Her most recent test results are showing an increase in white blood cells and bacteria.  Patient would like someone to review these tests and call her back.

## 2025-04-04 NOTE — TELEPHONE ENCOUNTER
Last UA per EMR: 03/26/2025    Updated Care Everywhere, noted new UA completed 04/03/2025 ordered by [facility] showing trace Ketones, WBC 6-10, and many bacteria      TC to Patient ([phone])   States she started feeling UTI sx again yesterday - bladder pressure, fatigue, L flank pain, chills, tingling pain under L axilla - so she reached out to [facility] and was sent for another UA    Denies fever, chest pain/tightness/pressure, SOB    She has not yet followed up with Dr. Meléndez's office regarding the results, states reaching anyone from that office is difficult and Dr. Meléndez is currently on vacation    Patient is open to seeing an [facility] for a second opinion if PCP has any specific recommendations  States Dr. Meléndez told her she didn't see any blockage from the kidney stone but did recommend surgery    She would prefer to have a second opinion before having any surgery     Please advise

## 2025-04-07 ENCOUNTER — HOSPITAL ENCOUNTER (INPATIENT)
Facility: HOSPITAL | Age: 75
LOS: 1 days | Discharge: HOME | End: 2025-04-08
Attending: EMERGENCY MEDICINE | Admitting: HOSPITALIST
Payer: MEDICARE

## 2025-04-07 ENCOUNTER — APPOINTMENT (OUTPATIENT)
Dept: GENERAL RADIOLOGY | Facility: HOSPITAL | Age: 75
End: 2025-04-07
Payer: MEDICARE

## 2025-04-07 ENCOUNTER — APPOINTMENT (OUTPATIENT)
Dept: ULTRASOUND IMAGING | Facility: HOSPITAL | Age: 75
End: 2025-04-07
Attending: EMERGENCY MEDICINE
Payer: MEDICARE

## 2025-04-07 DIAGNOSIS — E87.6 HYPOKALEMIA: ICD-10-CM

## 2025-04-07 DIAGNOSIS — R30.0 DYSURIA: Primary | ICD-10-CM

## 2025-04-07 PROBLEM — R73.9 HYPERGLYCEMIA: Status: ACTIVE | Noted: 2025-04-07

## 2025-04-07 PROBLEM — R79.89 AZOTEMIA: Status: ACTIVE | Noted: 2025-04-07

## 2025-04-07 LAB
ALBUMIN SERPL-MCNC: 4.7 G/DL (ref 3.2–4.8)
ALBUMIN/GLOB SERPL: 2 {RATIO} (ref 1–2)
ALP LIVER SERPL-CCNC: 41 U/L
ALT SERPL-CCNC: 12 U/L
ANION GAP SERPL CALC-SCNC: 13 MMOL/L (ref 0–18)
AST SERPL-CCNC: 20 U/L (ref ?–34)
ATRIAL RATE: 102 BPM
BASOPHILS # BLD AUTO: 0.04 X10(3) UL (ref 0–0.2)
BASOPHILS NFR BLD AUTO: 0.5 %
BILIRUB SERPL-MCNC: 0.7 MG/DL (ref 0.2–1.1)
BILIRUB UR QL STRIP.AUTO: NEGATIVE
BUN BLD-MCNC: 14 MG/DL (ref 9–23)
CALCIUM BLD-MCNC: 9.8 MG/DL (ref 8.7–10.6)
CHLORIDE SERPL-SCNC: 96 MMOL/L (ref 98–112)
CLARITY UR REFRACT.AUTO: CLEAR
CO2 SERPL-SCNC: 27 MMOL/L (ref 21–32)
COLOR UR AUTO: COLORLESS
CREAT BLD-MCNC: 0.6 MG/DL
EGFRCR SERPLBLD CKD-EPI 2021: 93 ML/MIN/1.73M2 (ref 60–?)
EOSINOPHIL # BLD AUTO: 0.04 X10(3) UL (ref 0–0.7)
EOSINOPHIL NFR BLD AUTO: 0.5 %
ERYTHROCYTE [DISTWIDTH] IN BLOOD BY AUTOMATED COUNT: 14.3 %
FLUAV + FLUBV RNA SPEC NAA+PROBE: NEGATIVE
FLUAV + FLUBV RNA SPEC NAA+PROBE: NEGATIVE
GLOBULIN PLAS-MCNC: 2.4 G/DL (ref 2–3.5)
GLUCOSE BLD-MCNC: 101 MG/DL (ref 70–99)
GLUCOSE UR STRIP.AUTO-MCNC: NORMAL MG/DL
HCT VFR BLD AUTO: 40.2 %
HGB BLD-MCNC: 13.6 G/DL
IMM GRANULOCYTES # BLD AUTO: 0.02 X10(3) UL (ref 0–1)
IMM GRANULOCYTES NFR BLD: 0.2 %
KETONES UR STRIP.AUTO-MCNC: 60 MG/DL
LEUKOCYTE ESTERASE UR QL STRIP.AUTO: NEGATIVE
LYMPHOCYTES # BLD AUTO: 2.21 X10(3) UL (ref 1–4)
LYMPHOCYTES NFR BLD AUTO: 27.3 %
MAGNESIUM SERPL-MCNC: 1.8 MG/DL (ref 1.6–2.6)
MCH RBC QN AUTO: 27.9 PG (ref 26–34)
MCHC RBC AUTO-ENTMCNC: 33.8 G/DL (ref 31–37)
MCV RBC AUTO: 82.4 FL
MONOCYTES # BLD AUTO: 0.62 X10(3) UL (ref 0.1–1)
MONOCYTES NFR BLD AUTO: 7.6 %
NEUTROPHILS # BLD AUTO: 5.18 X10 (3) UL (ref 1.5–7.7)
NEUTROPHILS # BLD AUTO: 5.18 X10(3) UL (ref 1.5–7.7)
NEUTROPHILS NFR BLD AUTO: 63.9 %
NITRITE UR QL STRIP.AUTO: NEGATIVE
OSMOLALITY SERPL CALC.SUM OF ELEC: 283 MOSM/KG (ref 275–295)
P AXIS: 74 DEGREES
P-R INTERVAL: 138 MS
PH UR STRIP.AUTO: 6 [PH] (ref 5–8)
PLATELET # BLD AUTO: 276 10(3)UL (ref 150–450)
POTASSIUM SERPL-SCNC: 2.9 MMOL/L (ref 3.5–5.1)
PROT SERPL-MCNC: 7.1 G/DL (ref 5.7–8.2)
PROT UR STRIP.AUTO-MCNC: NEGATIVE MG/DL
Q-T INTERVAL: 350 MS
QRS DURATION: 84 MS
QTC CALCULATION (BEZET): 456 MS
R AXIS: 36 DEGREES
RBC # BLD AUTO: 4.88 X10(6)UL
RBC UR QL AUTO: NEGATIVE
RSV RNA SPEC NAA+PROBE: NEGATIVE
SARS-COV-2 RNA RESP QL NAA+PROBE: NOT DETECTED
SODIUM SERPL-SCNC: 136 MMOL/L (ref 136–145)
SP GR UR STRIP.AUTO: 1.01 (ref 1–1.03)
T AXIS: 24 DEGREES
TROPONIN I SERPL HS-MCNC: 3 NG/L
UROBILINOGEN UR STRIP.AUTO-MCNC: NORMAL MG/DL
VENTRICULAR RATE: 102 BPM
WBC # BLD AUTO: 8.1 X10(3) UL (ref 4–11)

## 2025-04-07 PROCEDURE — 76770 US EXAM ABDO BACK WALL COMP: CPT | Performed by: EMERGENCY MEDICINE

## 2025-04-07 PROCEDURE — 99223 1ST HOSP IP/OBS HIGH 75: CPT | Performed by: HOSPITALIST

## 2025-04-07 PROCEDURE — 71045 X-RAY EXAM CHEST 1 VIEW: CPT | Performed by: EMERGENCY MEDICINE

## 2025-04-07 RX ORDER — ENOXAPARIN SODIUM 100 MG/ML
30 INJECTION SUBCUTANEOUS DAILY
Status: DISCONTINUED | OUTPATIENT
Start: 2025-04-08 | End: 2025-04-08

## 2025-04-07 RX ORDER — ONDANSETRON 2 MG/ML
4 INJECTION INTRAMUSCULAR; INTRAVENOUS EVERY 6 HOURS PRN
Status: DISCONTINUED | OUTPATIENT
Start: 2025-04-07 | End: 2025-04-08

## 2025-04-07 RX ORDER — POTASSIUM CHLORIDE 1.5 G/1.58G
40 POWDER, FOR SOLUTION ORAL ONCE
Status: COMPLETED | OUTPATIENT
Start: 2025-04-07 | End: 2025-04-07

## 2025-04-07 RX ORDER — HYDROCHLOROTHIAZIDE 12.5 MG/1
12.5 TABLET ORAL EVERY MORNING
Status: DISCONTINUED | OUTPATIENT
Start: 2025-04-08 | End: 2025-04-08

## 2025-04-07 RX ORDER — ACETAMINOPHEN 160 MG
2000 TABLET,DISINTEGRATING ORAL DAILY
COMMUNITY

## 2025-04-07 RX ORDER — POTASSIUM CHLORIDE 14.9 MG/ML
20 INJECTION INTRAVENOUS ONCE
Status: COMPLETED | OUTPATIENT
Start: 2025-04-07 | End: 2025-04-07

## 2025-04-07 RX ORDER — FAMOTIDINE 20 MG/1
40 TABLET, FILM COATED ORAL DAILY
Status: DISCONTINUED | OUTPATIENT
Start: 2025-04-08 | End: 2025-04-08

## 2025-04-07 RX ORDER — SIMETHICONE 80 MG
TABLET,CHEWABLE ORAL EVERY 6 HOURS PRN
COMMUNITY

## 2025-04-07 RX ORDER — ATORVASTATIN CALCIUM 20 MG/1
20 TABLET, FILM COATED ORAL NIGHTLY
Status: DISCONTINUED | OUTPATIENT
Start: 2025-04-07 | End: 2025-04-08

## 2025-04-07 RX ORDER — ASPIRIN 81 MG/1
81 TABLET ORAL
Status: DISCONTINUED | OUTPATIENT
Start: 2025-04-07 | End: 2025-04-08

## 2025-04-07 RX ORDER — METOCLOPRAMIDE HYDROCHLORIDE 5 MG/ML
5 INJECTION INTRAMUSCULAR; INTRAVENOUS EVERY 8 HOURS PRN
Status: DISCONTINUED | OUTPATIENT
Start: 2025-04-07 | End: 2025-04-08

## 2025-04-07 RX ORDER — GABAPENTIN 100 MG/1
100 CAPSULE ORAL NIGHTLY
Status: DISCONTINUED | OUTPATIENT
Start: 2025-04-07 | End: 2025-04-08

## 2025-04-07 RX ORDER — ACETAMINOPHEN 500 MG
500 TABLET ORAL EVERY 4 HOURS PRN
Status: DISCONTINUED | OUTPATIENT
Start: 2025-04-07 | End: 2025-04-08

## 2025-04-07 RX ORDER — SODIUM CHLORIDE 9 MG/ML
INJECTION, SOLUTION INTRAVENOUS CONTINUOUS
Status: DISCONTINUED | OUTPATIENT
Start: 2025-04-07 | End: 2025-04-08

## 2025-04-07 RX ORDER — CYCLOSPORINE 0.5 MG/ML
1 EMULSION OPHTHALMIC 2 TIMES DAILY
COMMUNITY

## 2025-04-07 RX ORDER — GABAPENTIN 100 MG/1
100 CAPSULE ORAL DAILY
Status: DISCONTINUED | OUTPATIENT
Start: 2025-04-08 | End: 2025-04-07

## 2025-04-07 RX ORDER — POLYETHYLENE GLYCOL 3350 17 G/17G
5.7 POWDER, FOR SOLUTION ORAL DAILY PRN
COMMUNITY

## 2025-04-07 NOTE — ED INITIAL ASSESSMENT (HPI)
Pt states chronic UTI for 2 months.  Pt states fatigue, chest pains, SOB.  Pt states has mild pressure in bladder.  Pt denies fevers at home.

## 2025-04-07 NOTE — TELEPHONE ENCOUNTER
Patient called back, she states she didn't go to the ER because she had a few things to do and was hoping the urine cx results would come in before she went  She reports feeling worse now than this morning    She states she just heard back from Dr. Meléndez's office with the culture results, which shows two different organisms. Per Patient, Dr. Meléndez advised Macrobid since it covers both.   Patient expressed concern about this since she previously took Macrobid     Urine Cx results available under Encounters, Laboratory Encounter dated 04/03/2025    Patient states she would like to know how Dr. Hines would recommend treating this infection    She is heading [place] [facility] - states Dr. Meléndez's office also recommended this because she is feeling worse    She has appt scheduled with PCP for tomorrow:  Future Appointments   Date Time Provider Department Center   4/8/2025  9:30 AM Lam Hines MD [facility]   4/22/2025 10:30 AM Jaja Rust PA-C [id number]   6/2/2025  9:15 AM Carlita Beaver DO [facility]   9/2/2025 11:20 AM [facility] D[facility]   3/30/2026  1:15 PM Andrew Raymond MD [facility]     But intends to keep as a follow up in case she is not admitted    Routed to Dr. Hines to inform

## 2025-04-07 NOTE — TELEPHONE ENCOUNTER
Patient calling with concern she needs to go to ER    Reports feeling unwell around noon yesterday  +HR racing  +BP up    She got her HR down by the evening with rest but BP did not improve    She did not start the abx ordered by Uro    Now, reports her BP is still up and reports  +temp lower: 96 this morning, 97 yesterday  +feels shaky but isn't visibly shaking  +HR is up - restin-low 100's; increases to 120's with any activity  SBP running 140's  DBP running 89-90's    Still with the pain by her axilla  Denies flank pain but reports sensitivity when she applies pressure to her flanks  Denies Chest pain/tightness/pressure, SOB    Patient will go to [facility] d/t cheri [facility]TON

## 2025-04-07 NOTE — TELEPHONE ENCOUNTER
I agree with ER evaluation.  It is noted that she is on the schedule with me tomorrow.  We will keep that at this time.       Lam Hines MD  [facility] Physician  Diplomate, American Board of Internal Medicine  Member, American College of Lifestyle Medicine  Member, American Association for Physician Leadership  [address], [facility] [address] [address]  [phone] (phone); [phone] (fax)  [email or web address]

## 2025-04-08 ENCOUNTER — TELEPHONE (OUTPATIENT)
Facility: CLINIC | Age: 75
End: 2025-04-08

## 2025-04-08 VITALS
TEMPERATURE: 98 F | SYSTOLIC BLOOD PRESSURE: 137 MMHG | OXYGEN SATURATION: 98 % | RESPIRATION RATE: 20 BRPM | HEART RATE: 90 BPM | BODY MASS INDEX: 21.17 KG/M2 | HEIGHT: 59 IN | WEIGHT: 105 LBS | DIASTOLIC BLOOD PRESSURE: 72 MMHG

## 2025-04-08 LAB
ANION GAP SERPL CALC-SCNC: 9 MMOL/L (ref 0–18)
BASOPHILS # BLD AUTO: 0.05 X10(3) UL (ref 0–0.2)
BASOPHILS NFR BLD AUTO: 0.9 %
BUN BLD-MCNC: 9 MG/DL (ref 9–23)
CALCIUM BLD-MCNC: 9.4 MG/DL (ref 8.7–10.6)
CHLORIDE SERPL-SCNC: 103 MMOL/L (ref 98–112)
CO2 SERPL-SCNC: 26 MMOL/L (ref 21–32)
CREAT BLD-MCNC: 0.55 MG/DL
EGFRCR SERPLBLD CKD-EPI 2021: 95 ML/MIN/1.73M2 (ref 60–?)
EOSINOPHIL # BLD AUTO: 0.09 X10(3) UL (ref 0–0.7)
EOSINOPHIL NFR BLD AUTO: 1.6 %
ERYTHROCYTE [DISTWIDTH] IN BLOOD BY AUTOMATED COUNT: 14.7 %
GLUCOSE BLD-MCNC: 91 MG/DL (ref 70–99)
HCT VFR BLD AUTO: 38.5 %
HGB BLD-MCNC: 12.7 G/DL
IMM GRANULOCYTES # BLD AUTO: 0.01 X10(3) UL (ref 0–1)
IMM GRANULOCYTES NFR BLD: 0.2 %
LYMPHOCYTES # BLD AUTO: 2.13 X10(3) UL (ref 1–4)
LYMPHOCYTES NFR BLD AUTO: 38.4 %
MCH RBC QN AUTO: 28 PG (ref 26–34)
MCHC RBC AUTO-ENTMCNC: 33 G/DL (ref 31–37)
MCV RBC AUTO: 84.8 FL
MONOCYTES # BLD AUTO: 0.47 X10(3) UL (ref 0.1–1)
MONOCYTES NFR BLD AUTO: 8.5 %
NEUTROPHILS # BLD AUTO: 2.79 X10 (3) UL (ref 1.5–7.7)
NEUTROPHILS # BLD AUTO: 2.79 X10(3) UL (ref 1.5–7.7)
NEUTROPHILS NFR BLD AUTO: 50.4 %
OSMOLALITY SERPL CALC.SUM OF ELEC: 284 MOSM/KG (ref 275–295)
PLATELET # BLD AUTO: 261 10(3)UL (ref 150–450)
POTASSIUM SERPL-SCNC: 4 MMOL/L (ref 3.5–5.1)
RBC # BLD AUTO: 4.54 X10(6)UL
SODIUM SERPL-SCNC: 138 MMOL/L (ref 136–145)
WBC # BLD AUTO: 5.5 X10(3) UL (ref 4–11)

## 2025-04-08 PROCEDURE — 99239 HOSP IP/OBS DSCHRG MGMT >30: CPT | Performed by: HOSPITALIST

## 2025-04-08 RX ORDER — GABAPENTIN 100 MG/1
100 CAPSULE ORAL NIGHTLY
COMMUNITY

## 2025-04-08 RX ORDER — FAMOTIDINE 20 MG/1
40 TABLET, FILM COATED ORAL DAILY
Status: DISCONTINUED | OUTPATIENT
Start: 2025-04-08 | End: 2025-04-08

## 2025-04-08 RX ORDER — POTASSIUM CITRATE 1080 MG/1
10 TABLET, EXTENDED RELEASE ORAL 2 TIMES DAILY
Qty: 60 TABLET | Refills: 1 | Status: SHIPPED | OUTPATIENT
Start: 2025-04-08

## 2025-04-08 NOTE — PROGRESS NOTES
[facility]   part of [facility]     Hospitalist Progress Note     Adilene Moreno Rest Patient Status:  Inpatient    1950 MRN [id number]   Location [facility] 5NW-A Attending Zaid Monge MD   Hosp Day # 1 PCP Lam Hines MD     Chief Complaint: Recurrent UTIs    Subjective:     Patient feels good, afebrile, denies abdominal pain, nausea, vomiting, dysuria, urgency, frequency or hematuria.    Objective:    Review of Systems:   A comprehensive review of systems was completed; pertinent positive and negatives stated in subjective.    Vital signs:  Temp:  [97.5 °F (36.4 °C)-98.1 °F (36.7 °C)] 97.6 °F (36.4 °C)  Pulse:  [] 78  Resp:  [16-24] 20  BP: (119-152)/(63-89) 137/63  SpO2:  [98 %-100 %] 98 %    Physical Exam:    General: No acute distress  Respiratory: No wheezes, no rhonchi  Cardiovascular: S1, S2, regular rate and rhythm  Abdomen: Soft, Non-tender, non-distended, positive bowel sounds  Neuro: No new focal deficits.   Extremities: No edema      Diagnostic Data:    Labs:  Recent Labs   Lab 25  1631 25  0721   WBC 8.1 5.5   HGB 13.6 12.7   MCV 82.4 84.8   .0 261.0       Recent Labs   Lab 25  1631 25  0721   * 91   BUN 14 9   CREATSERUM 0.60 0.55   CA 9.8 9.4   ALB 4.7  --     138   K 2.9* 4.0   CL 96* 103   CO2 27.0 26.0   ALKPHO 41*  --    AST 20  --    ALT 12  --    BILT 0.7  --    TP 7.1  --        Estimated Glomerular Filtration Rate: 95 mL/min/1.73m2 (result from lab).    Recent Labs   Lab 25  1631   TROPHS 3       No results for input(s): \"PTP\", \"INR\" in the last 168 hours.               Microbiology    No results found for this visit on 25.      Imaging: Reviewed in Epic.    Medications:    famotidine  40 mg Oral Daily    aspirin  81 mg Oral Q3 Days    atorvastatin  20 mg Oral Nightly    hydroCHLOROthiazide  12.5 mg Oral QAM    enoxaparin  30 mg Subcutaneous Daily    gabapentin  100 mg Oral Nightly       Assessment & Plan:       #Bacteruria  - dc abx per ID  - culture from 4/3 growing Enterrococcus and citrobacter  - urology consulted     # Hypertension  - Will continue on hydrochlorothiazide     # Hypokalemia  - Will replace per protocol.     # hyperlipidemia  - will continue on statin therapy    # Dispo, home today      Zaid Monge MD    Supplementary Documentation:     Quality:  DVT Mechanical Prophylaxis:   SCDs,    DVT Pharmacologic Prophylaxis   Medication    enoxaparin (Lovenox) 30 MG/0.3ML SUBQ injection 30 mg         DVT Pharmacologic prophylaxis: Aspirin 81 mg      Code Status: Prior  Carter: No urinary catheter in place  Cartre Duration (in days):   Central line:    VIRA:     Discharge is dependent on: progress  At this point Ms. Garvin is expected to be discharge to: home    The 21st Century Cures Act makes medical notes like these available to patients in the interest of transparency. Please be advised this is a medical document. Medical documents are intended to carry relevant information, facts as evident, and the clinical opinion of the practitioner. The medical note is intended as peer to peer communication and may appear blunt or direct. It is written in medical language and may contain abbreviations or verbiage that are unfamiliar.              **Certification      PHYSICIAN Certification of Need for Inpatient Hospitalization - Initial Certification    Patient will require inpatient services that will reasonably be expected to span two midnight's based on the clinical documentation in H+P.   Based on patients current state of illness, I anticipate that, after discharge, patient will require TBD.

## 2025-04-08 NOTE — TELEPHONE ENCOUNTER
Patient is being discharged from the hospital today and would like to be seen this week for a HFU. No available appointments.

## 2025-04-08 NOTE — H&P
[facility]     Adilene Garvin Patient Status:  Emergency    1950 MRN [id number]   Location [facility] EMERGENCY DEPARTMENT Attending Kurt Streeter, DO   Hosp Day # 0 PCP Lam Hines MD     Chief Complaint: chest pain, sob, fatigue    Subjective:    History of Present Illness:     Adilene Garvin is a 74 year old female with hx anxiety, asthma, GERD, hyperlipidemia presents to ED with recurrent UTI. Pt has been on multiple regimens of antibiotics. Pt last culture was on 4/3/2025 and was growing citrobacter and enterrococcus.  Patient denies any fevers but was having some chills.  No chest pain but was a little short of breath and was having some nausea and vomiting.  No diarrhea, hematuria.    History/Other:    Past Medical History:  Past Medical History:    Anxiety state, unspecified    ASTHMA    BPPV (benign paroxysmal positional vertigo)    Cervicalgia    Depression    Esophageal reflux    Fibrocystic disease of breast    GLAUCOMA NOS    IBS (irritable bowel syndrome)    Kidney mass    Lipoma    MENOPAUSE    Mixed hyperlipidemia    Osteopenia    Osteoporosis    Palpitations    Pre-diabetes    Proctalgia fugax    Tietze's disease    TMJ disorder    VITAMIN D DEFICIENCY NOS    Past Surgical History:   Past Surgical History:   Procedure Laterality Date    Colonoscopy N/A 2016    Colonic Polyps (adenomas)Procedure: COLONOSCOPY;  Surgeon: Marc Houser MD;  Location:  ENDOSCOPY    Colonoscopy  07/10/2020    TI wnl, sigmoid polyp    Colonoscopy,biopsy      adenoma    Colonoscopy,diagnostic  1/12/10    wnl, hemorrhoids    D & c      post men bleeding    Egd  07/10/2020    esophageal, gastric and small bowel bx taken    Egd N/A 7/10/2020    Procedure: ESOPHAGOGASTRODUODENOSCOPY, COLONOSCOPY, POSSIBLE BIOPSY, POSSIBLE POLYPECTOMY 76861, 48838;  Surgeon: Marc Houser MD;  Location: [facility] [place], [facility]    Gesoph reflx tst w/electrod   3/19-3/21/13    48 Rob[place]o on Dexilant/Zantac shows 0 reflux in a 48 hour period    Hemorrhoidectomy      Hernia surgery  08/06/2020    Repair incarcerated femoral hernia    Upper gi endoscopy,biopsy  6/5/06    wnl, Gastric and SB Bx wnl    Upper gi endoscopy,biopsy  1993    gastritis, bx for H. pylori negtive    Upper gi endoscopy,biopsy  1/12/10    wnl, Gastric and SB Bx wnl    Upper gi endoscopy,diagnosis  2001    wnl    Upper gi endoscopy,diagnosis  3/19/13    wnl, small hiatal hernia, proximal and distal esophageal bx normal    Upper gi endoscopy,diagnosis  02/01/2018    normal, esophageal, gastric and SB Bx negative      Family History:   Family History   Problem Relation Age of Onset    Glaucoma Mother     Eye Problems Mother     Other (Other) Mother     Diabetes Father         Type 2 DM    Heart Disease Father     Pulmonary Disease Father     Renal Disease Father     Hypertension Father     Kidney Disease Father     High Cholesterol Father     Lung Disorder Father     Heart Surgery Father     Pacemaker Father     Prostate Cancer Father     Other (Other) Father     Diabetes Sister     Lipids Sister     Hypertension Sister     High Cholesterol Sister     Asthma Sister     Lipids Brother     High Cholesterol Brother     High Cholesterol Daughter     Other (Other) Daughter     High Cholesterol Son     Gastro-Intestinal Disorder Son     Other (Other) Paternal Grandmother         Kyphosis    Breast Cancer Maternal Aunt 55        dx age 55    Breast Cancer Maternal Cousin Female 60        age at dx 60     Social History:    reports that she has never smoked. She has never used smokeless tobacco. She reports that she does not drink alcohol and does not use drugs.     Allergies: Allergies[1]    Medications:  Medications Ordered Prior to Encounter[2]    Review of Systems:   A comprehensive review of systems was completed.    Pertinent positives and negatives noted in the HPI.    Objective:   Physical Exam:    /80    Pulse 81   Temp 97.5 °F (36.4 °C) (Temporal)   Resp 24   Ht 4' 11\" (1.499 m)   Wt 105 lb (47.6 kg)   SpO2 100%   BMI 21.21 kg/m²   General: No acute distress, Alert  Respiratory: No rhonchi, no wheezes  Cardiovascular: S1, S2. Regular rate and rhythm  Abdomen: Soft, Non-tender, non-distended, positive bowel sounds  Neuro: No new focal deficits  Extremities: No edema      Results:    Labs:      Labs Last 24 Hours:    Recent Labs   Lab 04/07/25  1631   RBC 4.88   HGB 13.6   HCT 40.2   MCV 82.4   MCH 27.9   MCHC 33.8   RDW 14.3   NEPRELIM 5.18   WBC 8.1   .0       Recent Labs   Lab 04/07/25  1631   *   BUN 14   CREATSERUM 0.60   EGFRCR 93   CA 9.8   ALB 4.7      K 2.9*   CL 96*   CO2 27.0   ALKPHO 41*   AST 20   ALT 12   BILT 0.7   TP 7.1       Estimated Glomerular Filtration Rate: 93 mL/min/1.73m2 (result from lab).    Lab Results   Component Value Date    INR 0.98 07/31/2022       Recent Labs   Lab 04/07/25  1631   TROPHS 3       No results for input(s): \"TROP\", \"PBNP\" in the last 168 hours.    No results for input(s): \"PCT\" in the last 168 hours.    Imaging: Imaging data reviewed in Epic.    Assessment & Plan:      # UTI  - recurrent  - will continue on cefepime  - culture from 4/3 growing Enterrococcus and citrobacter.  - Urology on consult.    # Hypertension  - Will continue on hydrochlorothiazide    # Hypokalemia  - Will replace per protocol.    # hyperlipidemia  - will continue on statin therapy        Plan of care discussed with patient at bedside    Fausto Pinto, DO    Supplementary Documentation:     The 21st Century Cures Act makes medical notes like these available to patients in the interest of transparency. Please be advised this is a medical document. Medical documents are intended to carry relevant information, facts as evident, and the clinical opinion of the practitioner. The medical note is intended as peer to peer communication and may appear blunt or direct.  It is written in medical language and may contain abbreviations or verbiage that are unfamiliar.                                     [1]   Allergies  Allergen Reactions    Isovue [Isovue-M] OTHER (SEE COMMENTS)     INC. HR AND BP    Desipramine Hcl UNKNOWN    Omeprazole OTHER (SEE COMMENTS)     Stomach spasms - prefers dexilant nexium    Prednisone OTHER (SEE COMMENTS)     Tachycardia    [2]   No current facility-administered medications on file prior to encounter.     Current Outpatient Medications on File Prior to Encounter   Medication Sig Dispense Refill    atorvastatin 20 MG Oral Tab Take 1 tablet (20 mg total) by mouth nightly. 90 tablet 3    hydroCHLOROthiazide 12.5 MG Oral Tab Take 1 tablet (12.5 mg total) by mouth every morning. 90 tablet 3    gabapentin 100 MG Oral Cap Take 1 capsule (100 mg total) by mouth daily.      aspirin 81 MG Oral Tab EC Take 1 tablet (81 mg total) by mouth every 3 (three) days.      FAMOTIDINE 40 MG Oral Tab TAKE 1 TABLET BY MOUTH EVERY DAY 90 tablet 0    cephALEXin 500 MG Oral Cap Take 1 capsule (500 mg total) by mouth 2 (two) times daily. For 7 days 14 capsule     Sucralfate (CARAFATE OR) 1 every 3 days      Omega-3 Fatty Acids (OMEGA 3 500 OR) Take 500 mg by mouth As Directed.      Azelastine HCl 137 MCG/SPRAY Nasal Solution  (Patient not taking: Reported on 3/3/2025)      cycloSPORINE (RESTASIS OP) Apply 1 drop to eye. Each eye      Eyelid Cleansers (STERILID EX)       Probiotic Product (PROBIOTIC DAILY OR) Take by mouth.      Calcium Citrate 200 MG Oral Tab 1 tablet.      Cholecalciferol (VITAMIN D3) 2000 units Oral Tab Take 1 capsule by mouth.

## 2025-04-08 NOTE — PLAN OF CARE
Pt from home w/ spouse  Aox4  VSS on RA  afebrile  NSR on tele, receiving Lovenox  Electrolyte non-cardiac replacement  continent  Up self, call light within reach  Cardiac diet  Receiving Cefepime and IVF  Pt/family updated on current POC, no questions at this time    Problem: Patient/Family Goals  Goal: Patient/Family Long Term Goal  Description: Patient's Long Term Goal: DC    Interventions:  - IV abx  - ID consult  - Urology consult  - See additional Care Plan goals for specific interventions  Outcome: Progressing  Goal: Patient/Family Short Term Goal  Description: Patient's Short Term Goal: 4/7 noc- sleep    Interventions:   - Cluster care  - See additional Care Plan goals for specific interventions  Outcome: Progressing     Problem: METABOLIC/FLUID AND ELECTROLYTES - ADULT  Goal: Electrolytes maintained within normal limits  Description: INTERVENTIONS:- Monitor labs and rhythm and assess patient for signs and symptoms of electrolyte imbalances- Administer electrolyte replacement as ordered- Monitor response to electrolyte replacements, including rhythm and repeat lab results as appropriate- Fluid restriction as ordered- Instruct patient on fluid and nutrition restrictions as appropriate  Outcome: Progressing

## 2025-04-08 NOTE — TELEPHONE ENCOUNTER
FOLLOW-UP VISIT NOTE  7/31/2019    CONSULT REQUESTED BY:  Dr. Brody Huertas MD       PROBLEM LIST:  Right SI Joint pain  CLBP  Lumbar spondylosis  Bilateral hip pain, R>L    SUBJECTIVE:    This is a followup visit for the patient who was last seen on 6/18/19 for the above listed problems.    Currently, the patient presents following a right SIJ injection with Dr. Ramirez on 7/12/19.  Right sided complaints have completely resolved but now c/o same sx over left SI Joint and axial lower lumbar pain and requesting a left SI Joint injection.  Aggravating factors:  twisting.  Alleviating factors:  Chiropractor, medrol.  She denies weakness, numbness, tingling, muscle spasms and trouble with balance or a significant recent change with bladder and/or bowel control.    First visit: 6/18/19  History:   Kalie Boswell is a 67 year old year-old female, h/o fibromyalgia and osteoporosis, who presented to our clinic with complaints of constant CHRONIC low back and hip pain.  Complaints have been present for many years, came on insidiously with no known trauma, injury or triggering event.  More recently, 3-6 months ago had a ground level fall and fell onto her \"tail bone\" and has pain since but is improving after a course of medrol.     Epic review shows that patient is currently followed by rheumatology for right hip pain, fibromyalgia, polyarthritis, which improved following a Medrol Dosepak.  She does not have any recent hip XR but the last was 10/31/18 showing mild degenerative change of the hips and the lumbar spine.  She was told there is nothing wrong with her hips and while they were improving she continued to complain of LBP and referred to orthopaedics for possible JOSE C since she has responded well to them in the past.  In the past she has seen Dr. Parisi for unknown injections.  Last MRI from [place] radiology showing diffuse degenerative disc and facet disease without significant canal or foraminal stenosis. She also had  Patient currently in the ER.       Lam Hines MD  [facility] Physician  Diplomate, American Board of Internal Medicine  Member, American College of Lifestyle Medicine  Member, American Association for Physician Leadership  [address], [place] [address]  [phone] (phone); [phone] (fax)  [email or web address]          L4-5 facet arthropathy with acute changes on the right. Bilateral L4-5 facet joint injections performed by Dr. Parisi with improvement.  She did not return to him as he no longer accepts her insurance and therefore presents to our clinic for symptoms she feels are neither improving or getting worse.  She has also done PT in the past without much relief.  She is currently in warm water aerobics and feels this makes her stronger but really not helping with her presenting complaints.    · Describes as constant sharp shooting aching deep heavy grinding popping sore stabbing  · Severity - Current is 5-6; best is 4/10; worst is 8/10.  · Time of day when pain is usually worse - morning  · Sitting tolerance - unlimited  · Standing tolerance - 10-30 minutes  · Walking tolerance - 10-30 minutes  · History of similar symptoms in the past - Yes  · Is the patient on any blood thinners other than aspirin (i.e. Coumadin or Plavix) - No  · Is the patient aware of any contrast dye allergy - No  · History/current cancer - No    TREATMENT FOR CURRENT SYMPTOMS:  · Medications -  Yes ultram, medrol, celebrex (+)  · Physical Therapy - Yes (-)  · Chiropractic - Yes (+)  · Epidural/spinal injection - Yes   · 5/16/17 bilateral L4-5 facet joint injections by Dr. Parisi (+)  · 7/12/19 Right SI Joint injection with 100% relief of right sided complaints but now presents with pain over left SI Joint  · Neck/Back surgery - No  · Acupuncture - No  · Massage Therapy - No    SOCIAL HISTORY  Living situation: The patient lives with significant other  Alcohol / Drug Hx: Yes  Smoking Hx:  Reviewed as per Epic.    WORK STATUS   Work status:  Part Time, Retired, Self Employed  Type of Work: Engineering   Restrictions: no restrictions  Work Related Injury: No  WC Case:  No  Litigation: No      LABORATORY RESULTS:   No new results reviewed at today's visit.    IMAGING/DIAGNOSTIC STUDIES:   No new studies reviewed at today's visit.    XR SIJs  4/10/19  Normal    XR HIP 2-3 VIEW RIGHT AND PELVIS 10/31/2018  IMPRESSION: Mild degenerative change of the hips and lumbar spine.    MRI L spine [facility] 5/16/17  IMPRESSION:  1. Diffuse degenerative disc and facet disease without a significant canal or foraminal stenosis.  2. The patient's pain appears to be related to L4-5 facet arthropathy with acute changes on the right.    MEDICATIONS: Reviewed and reconciled as per the Epic record on this date.     Summary of your Discharge Medications           Accurate as of 7/31/19  2:30 PM. Always use your most recent med list.               Take these Medications      Details   albuterol 108 (90 Base) MCG/ACT inhaler   Inhale 2 puffs into the lungs every 4 hours as needed for Shortness of Breath or Wheezing.     azelastine 0.1 % nasal spray  Commonly known as:  ASTELIN   Spray 1 spray in each nostril 2 times daily as needed for Rhinitis.     budesonide-formoterol 160-4.5 MCG/ACT inhaler  Commonly known as:  SYMBICORT   Inhale 2 puffs into the lungs as needed.     * buPROPion 75 MG tablet  Commonly known as:  WELLBUTRIN   1 tab po once daily along with a 300 mg XL tab to equal 375 mg daily  Comment:  **Patient requests 90 days supply**     * buPROPion 300 MG 24 hr tablet  Commonly known as:  WELLBUTRIN XL   Take 1 tablet by mouth daily.     cetirizine 10 MG tablet  Commonly known as:  ZyrTEC   Take 10 mg by mouth daily.     COLLAGEN EX      EPINEPHrine 0.3 MG/0.3ML Device auto-injector  Commonly known as:  EPIPEN 2-RAYMUNDO   Inject 0.3 mLs into the muscle as needed (anaphylaxis).     estradiol 10 MCG vaginal tablet  Commonly known as:  VAGIFEM   Place 1 tablet vaginally nightly.     fluticasone 50 MCG/ACT nasal spray  Commonly known as:  FLONASE   Spray 2 sprays in each nostril daily.  Comment:  Please note the change to 2 puffs each nostril daily instead of the one puff each nostril daily.  Patient requests 90 day supply.     fluticasone furoate 200 MCG/ACT inhaler  Commonly  known as:  ARNUITY ELLIPTA   Inhale 1 puff into the lungs daily.     levothyroxine 100 MCG tablet  Commonly known as:  SYNTHROID, LEVOTHROID   Take 100 mcg by mouth daily.     loratadine 10 MG tablet  Commonly known as:  CLARITIN   Take 10 mg by mouth daily.     LORazepam 0.5 MG tablet  Commonly known as:  ATIVAN   Take 1 tablet by mouth every 8 hours as needed for Anxiety.     memantine 10 MG tablet  Commonly known as:  NAMENDA      MULTIVITAMIN ADULT PO   Take by mouth daily.     naproxen 500 MG tablet  Commonly known as:  NAPROSYN   Take 500 mg by mouth.     omeprazole 40 MG capsule  Commonly known as:  PriLOSEC   Take 1 capsule by mouth daily.     predniSONE 5 MG tablet  Commonly known as:  DELTASONE   Take 1 tablet by mouth daily.     ranitidine 75 MG tablet  Commonly known as:  ZANTAC   Take 75 mg by mouth daily as needed for Heartburn.     * rivastigmine 4.6 MG/24HR patch  Commonly known as:  EXELON   Place 1 patch onto the skin daily.     * rivastigmine 9.5 MG/24HR patch  Commonly known as:  EXELON   Place 1 patch onto the skin daily.     traMADol 50 MG tablet  Commonly known as:  ULTRAM   Take 50 mg by mouth every 4 hours as needed for Pain.     trazodone 300 MG tablet  Commonly known as:  DESYREL   TK 1 T PO NIGHTLY     vitamin D 2000 units capsule   Take 2,000 Units by mouth 2 times daily.         * This list has 4 medication(s) that are the same as other medications prescribed for you. Read the directions carefully, and ask your doctor or other care provider to review them with you.              Of interest:  naproxen, Ultram    Medications Prescribed in our clinic:     None    ALLERGIES  ALLERGIES:   Allergen Reactions   • Cabbage SHORTNESS OF BREATH   • Fish-Derived Products   (Food Or Med) SHORTNESS OF BREATH     Shrimp, shellfish   • Shrimp   (Food) SHORTNESS OF BREATH   • Cat Dander Cough   • Codeine Other (See Comments) and NAUSEA   • Dog Dander Cough   • Donepezil RASH and DIARRHEA   • Dust Other  (See Comments)   • Misc Natural Products Cough     Spices cause breathing difficulties at times.     PAST MEDICAL HISTORY  Past Medical History:   Diagnosis Date   • Ariboflavinosis    • Depressive disorder    • Essential hypertension, benign 2013    pt states this has resolved   • Fibromyalgia    • Food allergy     cabbage, shrimp - shortness of breath   • GERD (gastroesophageal reflux disease)    • Hyperglycemia    • Hyperthyroidism    • IBS (irritable bowel syndrome)    • Insomnia    • Multinodular goiter    • PONV (postoperative nausea and vomiting)    • RAD (reactive airway disease)    • Ulcerative colitis (CMS/HCC) 1963    No ongoing symptoms.   • Unspecified hypothyroidism 2013    Hx \"inconsistent\" thyroid test prior to , dx hypothyroid  with intiation of treatment by an endocrinologist.  Subsequent testing normalized off meds.     SURGICAL HISTORY  Past Surgical History:   Procedure Laterality Date   • Appendectomy      Complicated by rupture.   •  section, classic     • Colonoscopy  2014   • Colonoscopy diagnostic  2004   • Dexa bone density axial skeleton  2011    normal   • Esophagogastroduodenoscopy  12/15/2017   • Esophagogastroduodenoscopy transoral flex w/dilation esophagus  2014   • Eye surgery      cataracts and retina   • Fracture surgery      bilateral foot surgery 14   • Hysterectomy     • Inguinal hernia repair  2018    ROBOTIC XI ASSISTED LAPAROSCOPIC REPAIR OF RIGHT CANAL NUCK CYST AND RIGHT INGUINAL HERNIA POSSIBLE BILATERAL POSSIBLE by Dr. Carballo at [facility]   • Rotator cuff repair Bilateral ,      FAMILY HISTORY  Family History   Problem Relation Age of Onset   • Stroke Mother    • Psychiatric Mother         Borderline personality disorder; depression   • Vision Loss Mother    • Stroke Father    • Thyroid Father         Goiter, HAD SURGERY   • Diabetes Father         \"Borderline diabetes\" at advanced age.   •  Hearing Loss Father    • Heart disease Father    • Heart disease Sister    • Asthma Maternal Aunt      Musculoskeletal: Pertinent family history with musculoskeletal disease including: stroke - mother/father    REVIEW OF SYSTEMS  Please see HPI and note by MA above, which I have reviewed. A 10 point ROS was performed, all systems negative except as noted with an \"X\" or within the HPI.    PHYSICAL EXAM   VITALS:   Visit Vitals  Ht 5' 4\" (1.626 m)   Wt 63.5 kg   LMP 01/01/1999   BMI 24.03 kg/m²     GENERAL:  This patient is a 67 year old year old, White , female, who is pleasant appropriate WD-WN clean, and appears to be in mild discomfort.  Does not appear to be toxic from medications or otherwise.  Pain behaviors - occasional grimacing.  No Assistive Devices.  NEUROLOGIC:   A&Ox3.   Memory Recall:  intact  Judgment of insight: appropriate responses  Affect:  normal  STABILITY:  Able to rise from a seated to standing position with use of the armrest for assistance.  No LOB.  GAIT: limps, non-spastic, non-antalgic gait.     SENSATION: Intact to light touch in the lower extremities bilaterally.   STRENGTH: Individual motor testing of the lower extremities bilaterally reveals hip flexors 5/5, quadriceps 5/5, hamstring 5/5, EHL 5/5, Dorsiflexion 5/5, ankle plantarflexors 5/5 bilaterally   MOTOR: Tone is normal in all four extremities without fasciculations, atrophy, or myoclonus. There are no involuntary movements.       MUSCULOKELETAL:   INSPECTION:  No masses, deformities or erythema noted to cervical, thoracic, lumbar or sacral regions (spinous processes or musculature) or bilateral upper and lower extremities.  PALPATION: No tenderness to Spinous Processes, Greater Trochanters, Piriformis bilaterally.  No muscle tenderness noted.     Tenderness to: left SIJ  Spasmed Muscles: No  ROM:   Anterior thigh thrust: negative bilaterally  Internal and external hip rotation:  Left reproduces hip/groin pain; right -  FABERE:   Ipsilateral hip pain  Gaenslen's Test:  Ipsilateral groin pain  Compression Test:  Ipsilateral hip pain  Sacral Thrust Test:  + bilaterally  VASCULAR:  No swelling or edema noted in the lower extremities, bilaterally.   Pedal pulses are palpable bilaterally.   SKIN: No rashes, lesions, cafe-au-lait spots or ulcers noted on all four extremities or trunk.   RESPIRATORY: No shortness of breath or accessory muscle usage noted.    ASSESSMENT / PLAN   ASSESSMENT:  1. Left SI Joint pain, can't r/o hip etiology.  Offered left SIJ injection, if no improvement may need to consider further imaging or referral to orthopaedics for hip evaluation.  2. Right SI Joint pain - resolved following right SIJ injection  3. CLBP  4. Lumbar spondylosis  5. Bilateral hip pain, R>L    PLAN:  Our discussion included treatment options available to the patient.  These options included but are not limited to:  Therapy, chiropractic care, acupuncture, massage, medications, pain management, pain psychology, EMG, MRI, dietary changes, ice, heat, activity modifications, diagnostic/therapeutic left SIJ injection.  Agrees to SIJ injection.      1. Left SI Joint injection with Dr. Ramirez.   Procedure, benefits and risks (bleeding, infection, nerve injury) explained and agrees to proceed with injection.  Denies use of anticoagulant, allergy to contrast dye, DM or HTN.  Will follow-up after injection.  If no improvement, may need to consider updated imaging to r/o spine etiology or referral to orthopaedics for hip evaluation.    Kalie should follow-up after injection or sooner if symptoms worsen.  All questions answered.   She should call us in the meantime with any further questions or concerns.    Kalie Boswell either met with Jennifer Shrestha RN Spine Care Coordinator after the visit or received the necessary printed instructions to proceed with POC.      Penny Erazo, MSN RN [employer] [facility]-BC APNP/ Supervising Physician: Latoya Mckeon MD, [facility]  Director Back & Spine     Program status: Continue in Back and Spine Program

## 2025-04-08 NOTE — CM/SW NOTE
SW acknowledged order for SDOH for housing. Confirmed with RN that patient does in fact have a home and lives with her . She is a volunteer here with the therapy dogs. RN to correct SDOH flowsheet.    FAZAL/GEORGINA to remain available for support and/or discharge planning.    Marla FOX LCSW  Discharge Planner

## 2025-04-08 NOTE — CONSULTS
[facility] Urology Consultation    Adilene Garvin Patient Status:  Inpatient    1950 MRN [id number]   Location [facility] 5NW-A Attending Zaid Monge MD   Hosp Day # 1 PCP Lam Hines MD     Reason for Consultation:  Possible UTI    History of Present Illness:  Adilene Garvin is a a(n) 74 year old female with a history of recent UTI treated as an outpatient with Dr. Meléndez. She had been off of antibiotics for about 1 week and was generally not feeling well. She found that she had an elevated HR and higher than normal BP and came to the ER for fear of possible worsening infection. She has had bladder pressure but no dysuria, no cloudy urine, and no hematuria. She had no fever. She has also been constipated which is a chronic issue for her for which she takes miralax intermittently. She is feeling better today and has no complaints.    History:  Past Medical History:    Anxiety state, unspecified    ASTHMA    BPPV (benign paroxysmal positional vertigo)    Cervicalgia    Depression    Esophageal reflux    Fibrocystic disease of breast    GLAUCOMA NOS    High blood pressure    High cholesterol    IBS (irritable bowel syndrome)    Kidney mass    Lipoma    MENOPAUSE    Mixed hyperlipidemia    Osteopenia    Osteoporosis    Palpitations    Pre-diabetes    Proctalgia fugax    Tietze's disease    TMJ disorder    VITAMIN D DEFICIENCY NOS     Past Surgical History:   Procedure Laterality Date    Colonoscopy N/A 2016    Colonic Polyps (adenomas)Procedure: COLONOSCOPY;  Surgeon: Marc Houser MD;  Location:  ENDOSCOPY    Colonoscopy  07/10/2020    TI wnl, sigmoid polyp    Colonoscopy,biopsy      adenoma    Colonoscopy,diagnostic  1/12/10    wnl, hemorrhoids    D & c      post men bleeding    Egd  07/10/2020    esophageal, gastric and small bowel bx taken    Egd N/A 7/10/2020    Procedure: ESOPHAGOGASTRODUODENOSCOPY, COLONOSCOPY, POSSIBLE BIOPSY, POSSIBLE  POLYPECTOMY 81201, 55097;  Surgeon: Marc Houser MD;  Location: [facility], [facility]    G-esoph reflx tst w/electrod  3/19-3/21/13    48 Robledo on Dexilant/Zantac shows 0 reflux in a 48 hour period    Hemorrhoidectomy      Hernia surgery  08/06/2020    Repair incarcerated femoral hernia    Upper gi endoscopy,biopsy  6/5/06    wnl, Gastric and SB Bx wnl    Upper gi endoscopy,biopsy  1993    gastritis, bx for H. pylori negtive    Upper gi endoscopy,biopsy  1/12/10    wnl, Gastric and SB Bx wnl    Upper gi endoscopy,diagnosis  2001    wnl    Upper gi endoscopy,diagnosis  3/19/13    wnl, small hiatal hernia, proximal and distal esophageal bx normal    Upper gi endoscopy,diagnosis  02/01/2018    normal, esophageal, gastric and SB Bx negative     Family History   Problem Relation Age of Onset    Glaucoma Mother     Eye Problems Mother     Other (Other) Mother     Diabetes Father         Type 2 DM    Heart Disease Father     Pulmonary Disease Father     Renal Disease Father     Hypertension Father     Kidney Disease Father     High Cholesterol Father     Lung Disorder Father     Heart Surgery Father     Pacemaker Father     Prostate Cancer Father     Other (Other) Father     Diabetes Sister     Lipids Sister     Hypertension Sister     High Cholesterol Sister     Asthma Sister     Lipids Brother     High Cholesterol Brother     High Cholesterol Daughter     Other (Other) Daughter     High Cholesterol Son     Gastro-Intestinal Disorder Son     Other (Other) Paternal Grandmother         Kyphosis    Breast Cancer Maternal Aunt 55        dx age 55    Breast Cancer Maternal Cousin Female 60        age at dx 60      reports that she has never smoked. She has never used smokeless tobacco. She reports that she does not drink alcohol and does not use drugs.    Allergies:  Allergies[1]    Medications:    Current Facility-Administered Medications:     famotidine (Pepcid) tab 40 mg, 40 mg, Oral, Daily    aspirin DR tab 81  mg, 81 mg, Oral, Q3 Days    atorvastatin (Lipitor) tab 20 mg, 20 mg, Oral, Nightly    hydroCHLOROthiazide tab 12.5 mg, 12.5 mg, Oral, QAM    melatonin tab 3 mg, 3 mg, Oral, Nightly PRN    ondansetron (Zofran) 4 MG/2ML injection 4 mg, 4 mg, Intravenous, Q6H PRN    metoclopramide (Reglan) 5 mg/mL injection 5 mg, 5 mg, Intravenous, Q8H PRN    sodium chloride 0.9% infusion, , Intravenous, Continuous    enoxaparin (Lovenox) 30 MG/0.3ML SUBQ injection 30 mg, 30 mg, Subcutaneous, Daily    acetaminophen (Tylenol Extra Strength) tab 500 mg, 500 mg, Oral, Q4H PRN    gabapentin (Neurontin) cap 100 mg, 100 mg, Oral, Nightly    Review of Systems:  Pertinent items are noted in HPI.    Physical Exam:  Temp (24hrs), Av.7 °F (36.5 °C), Min:97.5 °F (36.4 °C), Max:98.1 °F (36.7 °C)    /72 (BP Location: Left arm)   Pulse 90   Temp 97.6 °F (36.4 °C) (Oral)   Resp 20   Ht 4' 11\" (1.499 m)   Wt 105 lb (47.6 kg)   SpO2 98%   BMI 21.21 kg/m²   No intake or output data in the 24 hours ending 25 1412  General: Calm, NAD  HEENT: NCAT, no scleral icterus  CV: RR  Pulmonary: breathing unlabored, symmetric bilaterally, no audible wheezes      Laboratory Data:  Lab Results   Component Value Date    WBC 5.5 2025    HGB 12.7 2025    HCT 38.5 2025    .0 2025    CREATSERUM 0.55 2025    BUN 9 2025     2025    K 4.0 2025     2025    CO2 26.0 2025    GLU 91 2025    CA 9.4 2025    MG 1.8 2025       Imaging:  US reviewed and showed non obstructing renal calculi and a 1.6 cm AML    Hospital Problem List:  Patient Active Problem List   Diagnosis    ARPIT (generalized anxiety disorder)    Adenomatous polyp of colon, unspecified part of colon    History of colon polyps    Benign cyst of right kidney    Osteoporosis    Chronic rhinosinusitis    Angiomyolipoma of left kidney    Low-tension glaucoma of both eyes, severe stage    Mild intermittent  asthma without complication (HCC)    History of TIA (transient ischemic attack) x 2    Age-related nuclear cataract of both eyes    Pure hypercholesterolemia    Chronic pericardial effusion (HCC)    Idiopathic hypercalciuria    Lumbar spondylosis    Levoscoliosis of thoracolumbar spine    Prediabetes    Dry eye syndrome of both eyes    Neurogenic cough, chronic    Bilateral carpal tunnel syndrome    Functional gastrointestinal disorder    Bilateral, nonobstructive, chronic nephrolithiasis    Disorder of vocal cord    Elevated lipoprotein(a)    Other specified dermatitis (Eyelid dermatitis)    Family history of heart disease    Thoracic aorta atherosclerosis - seen on 12/26/24 Chest X-ray    Dysuria    Azotemia    Hyperglycemia    Hypokalemia       IMPRESSION    1. History of UTI  -UA was normal on admission and she has been off abx for 1 week  -No current evidence of UTI     2. Bladder pressure  -Likely due to constipation  -Bladder US neg    3. 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 Ok to dc from  standpoint  2. Discussed importance of bowel regularity and the relationship to normal bladder function and preventing UTIs. She will start taking a reduced dose of miralax daily and titrate as needed with the goal of a soft daily BM.  3. No need for any antibiotics  4. Follow up with Dr. Meléndez as an outpatient      The above impression and plan were discussed in detail with the patient and her  who verbalized understanding and all questions were answered.    Thank you for allowing me to participate in the care of your patient.    Erik Hanley D.O.  [facility] Urology      4/8/2025  2:09 PM         [1]   Allergies  Allergen Reactions    Isovue [Isovue-M] OTHER (SEE COMMENTS)     INC. HR AND BP    Desipramine Hcl UNKNOWN    Omeprazole OTHER (SEE COMMENTS)     Stomach spasms - prefers dexilant  nexium    Prednisone OTHER (SEE COMMENTS)     Tachycardia

## 2025-04-08 NOTE — ED PROVIDER NOTES
Patient Seen in: [facility] Emergency Department      History     Chief Complaint   Patient presents with    Chest Pain Angina    Urinary Symptoms     Stated Complaint: chest pain    Subjective:   74-year-old female, presents with urinary symptoms including dysuria frequency and urgency.  Also points of myalgias and bodyaches and cough and shortness of breath and chest pain and weakness.  Has been having Enterobacter UTIs.  Multiple rounds of Macrobid, also is on Levaquin, also is on ampicillin recently.  Has been seeing [facility] as well.  Had a recent culture 4 days ago with Citrobacter and Enterobacter.  Came back in for recurrence of urinary symptoms.              Objective:     Past Medical History:    Anxiety state, unspecified    ASTHMA    BPPV (benign paroxysmal positional vertigo)    Cervicalgia    Depression    Esophageal reflux    Fibrocystic disease of breast    GLAUCOMA NOS    IBS (irritable bowel syndrome)    Kidney mass    Lipoma    MENOPAUSE    Mixed hyperlipidemia    Osteopenia    Osteoporosis    Palpitations    Pre-diabetes    Proctalgia fugax    Tietze's disease    TMJ disorder    VITAMIN D DEFICIENCY NOS              Past Surgical History:   Procedure Laterality Date    Colonoscopy N/A 8/22/2016    Colonic Polyps (adenomas)Procedure: COLONOSCOPY;  Surgeon: Marc Houser MD;  Location:  ENDOSCOPY    Colonoscopy  07/10/2020    TI wnl, sigmoid polyp    Colonoscopy,biopsy  2005    adenoma    Colonoscopy,diagnostic  1/12/10    wnl, hemorrhoids    D & c  2009    post men bleeding    Egd  07/10/2020    esophageal, gastric and small bowel bx taken    Egd N/A 7/10/2020    Procedure: ESOPHAGOGASTRODUODENOSCOPY, COLONOSCOPY, POSSIBLE BIOPSY, POSSIBLE POLYPECTOMY 67454, 10082;  Surgeon: Marc Houser MD;  Location: [facility]    G-esoph reflx tst w/electrod  3/19-3/21/13    48 Robledo on Dexilant/Zantac shows 0 reflux in a 48 hour period    Hemorrhoidectomy      Hernia  surgery  08/06/2020    Repair incarcerated femoral hernia    Upper gi endoscopy,biopsy  6/5/06    wnl, Gastric and SB Bx wnl    Upper gi endoscopy,biopsy  1993    gastritis, bx for H. pylori negtive    Upper gi endoscopy,biopsy  1/12/10    wnl, Gastric and SB Bx wnl    Upper gi endoscopy,diagnosis  2001    wnl    Upper gi endoscopy,diagnosis  3/19/13    wnl, small hiatal hernia, proximal and distal esophageal bx normal    Upper gi endoscopy,diagnosis  02/01/2018    normal, esophageal, gastric and SB Bx negative                Social History     Socioeconomic History    Marital status:    Tobacco Use    Smoking status: Never    Smokeless tobacco: Never   Vaping Use    Vaping status: Never Used   Substance and Sexual Activity    Alcohol use: No     Alcohol/week: 0.0 standard drinks of alcohol    Drug use: No    Sexual activity: Never     Partners: Male   Other Topics Concern    Caffeine Concern No    Exercise Yes     Comment: lots   Social History Narrative    : 1971    Children: 3    Exercise: walks, [employer] less so,     Employment: retired     Caffeine intake: minimal                      Physical Exam     ED Triage Vitals [04/07/25 1615]   /82   Pulse 96   Resp 16   Temp 97.5 °F (36.4 °C)   Temp src Temporal   SpO2 98 %   O2 Device None (Room air)       Current Vitals:   Vital Signs  BP: 139/69  Pulse: 83  Resp: 17  Temp: 97.5 °F (36.4 °C)  Temp src: Temporal  MAP (mmHg): 90    Oxygen Therapy  SpO2: 99 %  O2 Device: None (Room air)        Physical Exam  Vitals and nursing note reviewed.   Constitutional:       General: She is not in acute distress.     Appearance: She is not ill-appearing, toxic-appearing or diaphoretic.   HENT:      Head: Normocephalic.   Cardiovascular:      Rate and Rhythm: Normal rate.      Pulses:           Radial pulses are 2+ on the right side and 2+ on the left side.      Heart sounds: Normal heart sounds.   Pulmonary:      Effort:  Pulmonary effort is normal.      Breath sounds: Normal breath sounds.   Abdominal:      Palpations: Abdomen is soft. There is no mass.      Tenderness: There is no abdominal tenderness. There is no guarding or rebound.   Musculoskeletal:      Cervical back: Normal range of motion and neck supple.   Skin:     General: Skin is warm and dry.   Neurological:      Mental Status: She is alert.   Psychiatric:         Behavior: Behavior normal.             ED Course     Labs Reviewed   COMP METABOLIC PANEL (14) - Abnormal; Notable for the following components:       Result Value    Glucose 101 (*)     Potassium 2.9 (*)     Chloride 96 (*)     Alkaline Phosphatase 41 (*)     All other components within normal limits   URINALYSIS WITH CULTURE REFLEX - Abnormal; Notable for the following components:    Urine Color Colorless (*)     Ketones Urine 60 (*)     All other components within normal limits   TROPONIN I HIGH SENSITIVITY - Normal   MAGNESIUM - Normal   SARS-COV-2/FLU A AND B/RSV BY PCR (GENEXPERT) - Normal    Narrative:     This test is intended for the qualitative detection and differentiation of SARS-CoV-2, influenza A, influenza B, and respiratory syncytial virus (RSV) viral RNA in nasopharyngeal or nares swabs from individuals suspected of respiratory viral infection consistent with COVID-19 by their healthcare provider. Signs and symptoms of respiratory viral infection due to SARS-CoV-2, influenza, and RSV can be similar.    Test performed using the Xpert Xpress SARS-CoV-2/FLU/RSV (real time RT-PCR)  assay on the GeneXpert instrument, [employer], [place] [address].   This test is being used under the Food and Drug Administration's Emergency Use Authorization.    The authorized Fact Sheet for Healthcare Providers for this assay is available upon request from the laboratory.   CBC WITH DIFFERENTIAL WITH PLATELET     EKG    Rate, intervals and axes as noted on EKG Report.  Rate: 102  Rhythm: Sinus Rhythm  Reading: EKG  sinus tachycardia 102 bpm.  Normal axis.  No ST elevations.  , QRS 84,  ms.  Compared to July 2022, no significant changes noted    Patient placed on cardiac monitor for telemetry monitoring secondary to weakness, fatigue, htn. Interpretation at bedside by me is sinus rhythm.                         [facility]      US KIDNEY/BLADDER (CPT=76770)    Result Date: 4/7/2025  CONCLUSION:  1. Bilateral nonobstructive nephrolithiasis. 2. Echogenic lower pole mass of left kidney consistent with angiomyolipoma.   LOCATION:  [facility]     Dictated by (CST): Lucas Willett MD on 4/07/2025 at 7:20 PM     Finalized by (CST): Lucas Willett MD on 4/07/2025 at 7:22 PM       XR CHEST AP PORTABLE  (CPT=71045)    Result Date: 4/7/2025  CONCLUSION:  No acute airspace disease.   LOCATION:  [facility]      Dictated by (CST): José Deluca MD on 4/07/2025 at 6:04 PM     Finalized by (CST): José Deluca MD on 4/07/2025 at 6:04 PM        I have independently interpreted x-ray of the chest without any obvious signs of acute infiltrate    Differential diagnosis includes, but not limited to, UTI, kidney stone, pyelonephritis, resistant bacteria     at bedside helpful to provide information on the history presenting illness    External chart review demonstrates during encounters with [facility] including today, over the past week and her previous cultures    74-year-old female with UTI, multiple antibiotic treatments.  Difficult to interpret the most recent culture because the lines of susceptibilities and antibiotics resistance do not wind up with a wait formatted in care everywhere.  It appears that it sensitive for both the Citrobacter and Enterobacter to cefepime.  I did discuss Dr. Tai, will give her IV antibiotics he will see in consultation.  She has nonobstructing nephrolithiasis.  Her labs are stable, vital signs are stable, she is worried about blood pressure 150 but is 123 without any treatment here now.  Will fix her  potassium of 2.9.  Awaiting bed assignment.  Admitted to Dr. Massey    Admission disposition: 4/7/2025  8:18 PM           Medical Decision Making      Disposition and Plan     Clinical Impression:  1. Dysuria    2. Hypokalemia         Disposition:  Admit  4/7/2025  8:18 pm    Follow-up:  No follow-up provider specified.        Medications Prescribed:  Current Discharge Medication List              Supplementary Documentation:         Hospital Problems       Present on Admission  Date Reviewed: 3/24/2025            ICD-10-CM Noted POA    Azotemia R79.89 4/7/2025 Yes    Dysuria R30.0 4/7/2025 Unknown    Hyperglycemia R73.9 4/7/2025 Yes

## 2025-04-08 NOTE — CONSULTS
INFECTIOUS DISEASE CONSULTATION    Adilene Garvin Patient Status:  Inpatient    1950 MRN [id number]   Location [facility] 5NW-A Attending Zaid Monge MD   Hosp Day # 1 PCP Lam Hines MD       Requested by Dr. Massey    Reason for Consultation:  Positive urine culture    History of Present Illness:  Adilene Garvin is a a(n) 74 year old female was sent to ED after calling that her BP was high and heart rate fast.   She had a UA done as part of a routine physical 2 months ago and found incidentally to have bacteruria.  Subsequently she reported symptoms, while in [place], and was called in a script for NF.  She was re given levaquin on 3/7.     She had seen urologist outpatient and had a straight cath collected on 4/3 that showed Enterococcus faecalis and Citrobacter.  UA on admission is negative.       History:  Past Medical History:    Anxiety state, unspecified    ASTHMA    BPPV (benign paroxysmal positional vertigo)    Cervicalgia    Depression    Esophageal reflux    Fibrocystic disease of breast    GLAUCOMA NOS    High blood pressure    High cholesterol    IBS (irritable bowel syndrome)    Kidney mass    Lipoma    MENOPAUSE    Mixed hyperlipidemia    Osteopenia    Osteoporosis    Palpitations    Pre-diabetes    Proctalgia fugax    Tietze's disease    TMJ disorder    VITAMIN D DEFICIENCY NOS     Past Surgical History:   Procedure Laterality Date    Colonoscopy N/A 2016    Colonic Polyps (adenomas)Procedure: COLONOSCOPY;  Surgeon: Marc Houser MD;  Location:  ENDOSCOPY    Colonoscopy  07/10/2020    TI wnl, sigmoid polyp    Colonoscopy,biopsy      adenoma    Colonoscopy,diagnostic  1/12/10    wnl, hemorrhoids    D & c      post men bleeding    Egd  07/10/2020    esophageal, gastric and small bowel bx taken    Egd N/A 7/10/2020    Procedure: ESOPHAGOGASTRODUODENOSCOPY, COLONOSCOPY, POSSIBLE BIOPSY, POSSIBLE POLYPECTOMY  77791, 57539;  Surgeon: Marc Houser MD;  Location: [facility]    G-esoph reflx tst w/electrod  3/19-3/21/13    48 Robledo on Dexilant/Zantac shows 0 reflux in a 48 hour period    Hemorrhoidectomy      Hernia surgery  08/06/2020    Repair incarcerated femoral hernia    Upper gi endoscopy,biopsy  6/5/06    wnl, Gastric and SB Bx wnl    Upper gi endoscopy,biopsy  1993    gastritis, bx for H. pylori negtive    Upper gi endoscopy,biopsy  1/12/10    wnl, Gastric and SB Bx wnl    Upper gi endoscopy,diagnosis  2001    wnl    Upper gi endoscopy,diagnosis  3/19/13    wnl, small hiatal hernia, proximal and distal esophageal bx normal    Upper gi endoscopy,diagnosis  02/01/2018    normal, esophageal, gastric and SB Bx negative     Family History   Problem Relation Age of Onset    Glaucoma Mother     Eye Problems Mother     Other (Other) Mother     Diabetes Father         Type 2 DM    Heart Disease Father     Pulmonary Disease Father     Renal Disease Father     Hypertension Father     Kidney Disease Father     High Cholesterol Father     Lung Disorder Father     Heart Surgery Father     Pacemaker Father     Prostate Cancer Father     Other (Other) Father     Diabetes Sister     Lipids Sister     Hypertension Sister     High Cholesterol Sister     Asthma Sister     Lipids Brother     High Cholesterol Brother     High Cholesterol Daughter     Other (Other) Daughter     High Cholesterol Son     Gastro-Intestinal Disorder Son     Other (Other) Paternal Grandmother         Kyphosis    Breast Cancer Maternal Aunt 55        dx age 55    Breast Cancer Maternal Cousin Female 60        age at dx 60      reports that she has never smoked. She has never used smokeless tobacco. She reports that she does not drink alcohol and does not use drugs.      Allergies:  Allergies[1]    Medications:    Current Facility-Administered Medications:     aspirin DR tab 81 mg, 81 mg, Oral, Q3 Days    atorvastatin (Lipitor) tab 20 mg, 20  mg, Oral, Nightly    famotidine (Pepcid) tab 40 mg, 40 mg, Oral, Daily    hydroCHLOROthiazide tab 12.5 mg, 12.5 mg, Oral, QAM    melatonin tab 3 mg, 3 mg, Oral, Nightly PRN    ondansetron (Zofran) 4 MG/2ML injection 4 mg, 4 mg, Intravenous, Q6H PRN    metoclopramide (Reglan) 5 mg/mL injection 5 mg, 5 mg, Intravenous, Q8H PRN    sodium chloride 0.9% infusion, , Intravenous, Continuous    enoxaparin (Lovenox) 30 MG/0.3ML SUBQ injection 30 mg, 30 mg, Subcutaneous, Daily    acetaminophen (Tylenol Extra Strength) tab 500 mg, 500 mg, Oral, Q4H PRN    ceFEPIme (Maxipime) 1 g in sodium chloride 0.9% 100 mL IVPB-MBP, 1 g, Intravenous, Q8H    gabapentin (Neurontin) cap 100 mg, 100 mg, Oral, Nightly  Medications Ordered Prior to Encounter[2]    Review of Systems:    A comprehensive 10 point review of systems was completed.  Pertinent positives and negatives noted in the the HPI.      Physical Exam:    General: No acute distress. Alert and oriented x 3.  Vital signs: Temp:  [97.5 °F (36.4 °C)-98.1 °F (36.7 °C)] 97.6 °F (36.4 °C)  Pulse:  [] 78  Resp:  [16-24] 20  BP: (119-152)/(63-89) 137/63  SpO2:  [98 %-100 %] 98 %  Body mass index is 21.21 kg/m².  HEENT: Moist mucous membranes. Extraocular muscles are intact.  Neck: No swelling, no masses  Respiratory: Non labored, symmetric exursion  Cardiovascular: No irregularities in rhythm  Abdomen: Soft, nontender, nondistended.    Musculoskeletal: Full range of motion of all extremities.  No swelling noted.  Joints: no effusions  Skin: No lesions. No erythema, no open wounds    Laboratory Data:  Laboratory data reviewed      Recent Labs   Lab 04/07/25  1631   RBC 4.88   HGB 13.6   HCT 40.2   MCV 82.4   MCH 27.9   MCHC 33.8   RDW 14.3   NEPRELIM 5.18   WBC 8.1   .0       Recent Labs   Lab 04/07/25  1631   *   BUN 14   CREATSERUM 0.60   CA 9.8   ALB 4.7      K 2.9*   CL 96*   CO2 27.0   ALKPHO 41*   AST 20   ALT 12   BILT 0.7   TP 7.1         Lab Results    Component Value Date    MG 1.8 04/07/2025       Established Problem list:  Patient Active Problem List   Diagnosis    ARPIT (generalized anxiety disorder)    Adenomatous polyp of colon, unspecified part of colon    History of colon polyps    Benign cyst of right kidney    Osteoporosis    Chronic rhinosinusitis    Angiomyolipoma of left kidney    Low-tension glaucoma of both eyes, severe stage    Mild intermittent asthma without complication (HCC)    History of TIA (transient ischemic attack) x 2    Age-related nuclear cataract of both eyes    Pure hypercholesterolemia    Chronic pericardial effusion (HCC)    Idiopathic hypercalciuria    Lumbar spondylosis    Levoscoliosis of thoracolumbar spine    Prediabetes    Dry eye syndrome of both eyes    Neurogenic cough, chronic    Bilateral carpal tunnel syndrome    Functional gastrointestinal disorder    Bilateral, nonobstructive, chronic nephrolithiasis    Disorder of vocal cord    Elevated lipoprotein(a)    Other specified dermatitis (Eyelid dermatitis)    Family history of heart disease    Thoracic aorta atherosclerosis - seen on 12/26/24 Chest X-ray    Dysuria    Azotemia    Hyperglycemia    Hypokalemia       ASSESSMENT/PLAN:  1. Bacteruria, on culture collected on 4/3  UA on 4/7 is negative for UTI, negative LE and negative nitrates, no WBC  No fever or systemic symptoms    Can follow up with urologist and gynecologist  DC antibitoics              Shahram Gunter MD, MD [facility]  [phone]         [1]   Allergies  Allergen Reactions    Isovue [Isovue-M] OTHER (SEE COMMENTS)     INC. HR AND BP    Desipramine Hcl UNKNOWN    Omeprazole OTHER (SEE COMMENTS)     Stomach spasms - prefers dexilant nexium    Prednisone OTHER (SEE COMMENTS)     Tachycardia    [2]   No current facility-administered medications on file prior to encounter.     Current Outpatient Medications on File Prior to Encounter   Medication Sig Dispense Refill    Calcium  Citrate Does not apply Powder Take 600 mg by mouth every morning. ½ teaspoonful (600 mg total)      cholecalciferol 50 MCG (2000 UT) Oral Cap Take 1 capsule (2,000 Units total) by mouth daily.      cycloSPORINE 0.05 % Ophthalmic Emulsion Place 1 drop into both eyes 2 (two) times daily.      Omega-3 Fatty Acids (FISH OIL OR) Take 1 capsule by mouth daily.      polyethylene glycol, PEG 3350, (MIRALAX) 17 GM/SCOOP Oral Powder Take 5.7 g by mouth daily as needed (for constipation).      simethicone 80 MG Oral Chew Tab Chew 1-2 tablets ( mg total) by mouth every 6 (six) hours as needed for FLATULENCE.      atorvastatin 20 MG Oral Tab Take 1 tablet (20 mg total) by mouth nightly. 90 tablet 3    hydroCHLOROthiazide 12.5 MG Oral Tab Take 1 tablet (12.5 mg total) by mouth every morning. 90 tablet 3    sucralfate 1 g Oral Tab Take 1 tablet (1 g total) by mouth every 3 (three) days. Use on days when taking Aspirin      gabapentin 100 MG Oral Cap Take 1 capsule (100 mg total) by mouth 2 (two) times daily. (Patient taking differently: Take 1 capsule (100 mg total) by mouth at bedtime.)      aspirin 81 MG Oral Tab EC Take 1 tablet (81 mg total) by mouth every 3 (three) days.      FAMOTIDINE 40 MG Oral Tab TAKE 1 TABLET BY MOUTH EVERY DAY (Patient taking differently: Take 1 tablet (40 mg total) by mouth daily with dinner.) 90 tablet 0    Probiotic Product (PROBIOTIC DAILY OR) Take 3 capsules by mouth daily with lunch.      Eyelid Cleansers (OCUSOFT LID SCRUB ORIGINAL EX) Apply 1 Pump topically nightly as needed (to both eyelids).      Azelastine HCl 137 MCG/SPRAY Nasal Solution 2 sprays by Each Nare route 2 (two) times daily. (Patient taking differently: 2 sprays by Each Nare route 2 (two) times daily as needed.)

## 2025-04-08 NOTE — PROGRESS NOTES
NURSING ADMISSION NOTE      Patient admitted via Cart  Oriented to room.  Safety precautions initiated.  Bed in low position.  Call light in reach.    Pt admitted for dysuria and hypokalemia. Potassium replaced in ED. Recheck in am. Pt comfortable in bed receiving IVF. Education given and POC discussed. No questions or concerns at this time.

## 2025-04-08 NOTE — ED QUICK NOTES
Orders for admission, patient is aware of plan and ready to go upstairs. Any questions, please call ED RN Magdalene at extension [phone].     Patient Covid vaccination status: Fully vaccinated     COVID Test Ordered in ED: SARS-CoV-2/Flu A and B/RSV by PCR (GeneXpert)    COVID Suspicion at Admission: N/A    Running Infusions:  None    Mental Status/LOC at time of transport: A+OX4    Other pertinent information:   CIWA score: N/A   NIH score:  N/A

## 2025-04-09 NOTE — TELEPHONE ENCOUNTER
I spoke to the  Jv this evening.  I did not check the schedule, but he informed me that I have availability on 4/15/25 @ 4:15 PM???  Please see if we can do this time slot.  Thanks.       Lam Hines MD  [facility] Physician  Diplomate, American Board of Internal Medicine  Member, American College of Lifestyle Medicine  Member, American Association for Physician [address], [facility] [address], [place] [address]  [phone] (phone); [phone] (fax)  [email or web address]          left foot inj

## 2025-04-15 ENCOUNTER — OFFICE VISIT (OUTPATIENT)
Facility: CLINIC | Age: 75
End: 2025-04-15
Payer: MEDICARE

## 2025-04-15 VITALS
DIASTOLIC BLOOD PRESSURE: 60 MMHG | BODY MASS INDEX: 21 KG/M2 | WEIGHT: 104.19 LBS | HEIGHT: 59 IN | OXYGEN SATURATION: 99 % | TEMPERATURE: 98 F | HEART RATE: 81 BPM | SYSTOLIC BLOOD PRESSURE: 118 MMHG | RESPIRATION RATE: 16 BRPM

## 2025-04-15 DIAGNOSIS — R53.83 OTHER FATIGUE: ICD-10-CM

## 2025-04-15 DIAGNOSIS — N30.00 ACUTE CYSTITIS WITHOUT HEMATURIA: ICD-10-CM

## 2025-04-15 DIAGNOSIS — K59.00 CONSTIPATION, UNSPECIFIED CONSTIPATION TYPE: ICD-10-CM

## 2025-04-15 DIAGNOSIS — I10 PRIMARY HYPERTENSION: ICD-10-CM

## 2025-04-15 DIAGNOSIS — E87.6 HYPOKALEMIA: Primary | ICD-10-CM

## 2025-04-15 DIAGNOSIS — R00.0 TACHYCARDIA: ICD-10-CM

## 2025-04-15 PROCEDURE — 99214 OFFICE O/P EST MOD 30 MIN: CPT | Performed by: INTERNAL MEDICINE

## 2025-04-15 RX ORDER — ESTRADIOL 0.1 MG/G
CREAM VAGINAL
COMMUNITY
Start: 2025-04-15 | End: 2026-04-15

## 2025-04-15 NOTE — DISCHARGE SUMMARY
[facility]     Adilene Garvin Patient Status:  Inpatient    1950 MRN [id number]   Location [facility] 5NW-A Attending No att. providers found   Hosp Day # 1 PCP Lam Hines MD     Date of Admission: 2025  Date of Discharge:  2025     Discharge Disposition: Home or Self Care    Discharge Diagnosis and Brief synopsis:  #Bacteruria  - dc abx per ID  - culture from 4/3 growing Enterrococcus and citrobacter  - urology consulted     # Hypertension  - Will continue on hydrochlorothiazide     # Hypokalemia  - Will replace per protocol.     # hyperlipidemia  - will continue on statin therapy    History of Present Illness:   Adilene Garvin is a 74 year old female with hx anxiety, asthma, GERD, hyperlipidemia presents to ED with recurrent UTI. Pt has been on multiple regimens of antibiotics. Pt last culture was on 4/3/2025 and was growing citrobacter and enterrococcus.  Patient denies any fevers but was having some chills.  No chest pain but was a little short of breath and was having some nausea and vomiting.  No diarrhea, hematuria.         Lace+ Score: 64  59-90 High Risk  29-58 Medium Risk  0-28   Low Risk       TCM Follow-Up Recommendation:  LACE > 58: High Risk of readmission after discharge from the hospital.  **Certification    Admission date was 2025.  Inpatient stay was shorter than expected.  Patient's Dysuria was initially serious enough to expect a more lengthy hospitalization but patient improved faster than expected.                 Procedures during hospitalization:   none    Incidental or significant findings and recommendations (brief descriptions):  As above    Lab/Test results pending at Discharge:   none    Consultants:  ID, urology    Discharge Medication List:     Discharge Medications        START taking these medications        Instructions Prescription details   potassium citrate 10 MEQ (1080 MG) Tbcr  Commonly known as: Urocit-K      Take 1  tablet (10 mEq total) by mouth in the morning and 1 tablet (10 mEq total) before bedtime.   Quantity: 60 tablet  Refills: 1            CHANGE how you take these medications        Instructions Prescription details   famotidine 40 MG Tabs  Commonly known as: Pepcid  What changed: when to take this      TAKE 1 TABLET BY MOUTH EVERY DAY   Quantity: 90 tablet  Refills: 0            CONTINUE taking these medications        Instructions Prescription details   aspirin 81 MG Tbec      Take 1 tablet (81 mg total) by mouth every 3 (three) days.   Refills: 0     atorvastatin 20 MG Tabs  Commonly known as: Lipitor      Take 1 tablet (20 mg total) by mouth nightly.   Quantity: 90 tablet  Refills: 3     azelastine 137 MCG/SPRAY Soln      2 sprays by Each Nare route 2 (two) times daily.   Refills: 0     Calcium Citrate Powd      Take 600 mg by mouth every morning. ½ teaspoonful (600 mg total)   Refills: 0     cholecalciferol 50 MCG (2000 UT) Caps  Commonly known as: Vitamin D3      Take 1 capsule (2,000 Units total) by mouth daily.   Refills: 0     cycloSPORINE 0.05 % Emul  Commonly known as: RESTASIS      Place 1 drop into both eyes 2 (two) times daily.   Refills: 0     FISH OIL OR      Take 1 capsule by mouth daily.   Refills: 0     gabapentin 100 MG Caps  Commonly known as: Neurontin      Take 1 capsule (100 mg total) by mouth nightly.   Refills: 0     hydroCHLOROthiazide 12.5 MG Tabs      Take 1 tablet (12.5 mg total) by mouth every morning.   Quantity: 90 tablet  Refills: 3     MiraLax 17 GM/SCOOP Powd  Generic drug: polyethylene glycol (PEG 3350)      Take 5.7 g by mouth daily as needed (for constipation).   Refills: 0     OCUSOFT LID SCRUB ORIGINAL EX      Apply 1 Pump topically nightly as needed (to both eyelids).   Refills: 0     PROBIOTIC DAILY OR      Take 3 capsules by mouth daily with lunch.   Refills: 0     simethicone 80 MG Chew  Commonly known as: Mylicon      Chew 1-2 tablets ( mg total) by mouth every 6  (six) hours as needed for FLATULENCE.   Refills: 0     sucralfate 1 g Tabs  Commonly known as: Carafate      Take 1 tablet (1 g total) by mouth every 3 (three) days. Use on days when taking Aspirin   Refills: 0               Where to Get Your Medications        These medications were sent to [id number] - [facility] [address] [phone], [phone]  [address], [facility] [address]      Phone: [phone]   potassium citrate 10 MEQ (1080 MG) Tbcr         [place] reviewed[place]    Follow-up appointment:   Lam Hines MD  [address]  [address]  [facility] [address] [phone]    Follow up in 1 week(s)  Follow up    Appointments for Next 30 Days 4/15/2025 - 5/15/2025        Date Arrival Time Visit Type Length Department Provider     4/15/2025  4:15 PM  [facility] FOLLOW UP [0253] 45 min [facility] Lam Hines MD    Patient Instructions:         Location Instructions:     Masks are optional for all patients and visitors, unless otherwise indicated. Note: A $50 fee will be charged for missed appointments or same-day cancellations. Please provide 24 hours' notice if you need to cancel or reschedule your appointment.               4/22/2025 10:30 AM  CONSULT [6218] 30 min [facility] Jaja Rust PA-C    Patient Instructions:         Location Instructions:     Masks are optional for all patients and visitors, unless otherwise indicated. Note: A $50 fee will be charged for missed appointments or same-day cancellations. Please provide 24 hours' notice if you need to cancel or reschedule your appointment.                      Vital signs:       Physical Exam:    General: No acute distress   Lungs: clear to auscultation  Cardiovascular: S1, S2  Abdomen: Soft      -----------------------------------------------------------------------------------------------  PATIENT DISCHARGE  INSTRUCTIONS: See electronic chart    Zaid Monge MD    Total time spent on discharge plannin minutes     The  Century Cures Act makes medical notes like these available to patients in the interest of transparency. Please be advised this is a medical document. Medical documents are intended to carry relevant information, facts as evident, and the clinical opinion of the practitioner. The medical note is intended as peer to peer communication and may appear blunt or direct. It is written in medical language and may contain abbreviations or verbiage that are unfamiliar.

## 2025-04-15 NOTE — PROGRESS NOTES
The 21st Century Cures Act makes medical notes like these available to patients in the interest of transparency. Please be advised this is a medical document. Medical documents are intended to carry relevant information, facts as evident, and the clinical opinion of the practitioner. The medical note is intended as peer to peer communication and may appear blunt or direct. It is written in medical language and may contain abbreviations or verbiage that are unfamiliar.          Chief Complaint   Patient presents with    Hospital F/U     Dysuria, and low potassium         History of Present Illness  Adilene Garvin is a 74 year old female who presents with recurrent episodes of racing heart and fatigue, but is primarily here for hospital follow up    She experiences recurrent episodes of racing heart, with the most recent occurring on a Saturday night. During this episode, her heart rate was elevated, reaching 110 bpm while lying down and 120 bpm upon standing. She recalls a similar incident in [place], characterized by dizziness and shaking, but not a prolonged racing heart. These episodes have been sporadic, and she has a history of wearing Holter monitors approximately 20 years ago, when she was placed on a small dose of Toprol, which helped manage her symptoms at the time.    Since her recent hospital discharge on April 8th, she has been experiencing fatigue (and also pre-hospitalization), requiring naps during the day. She attributes some of her fatigue to hosting a large family gathering, which left her feeling exhausted despite assistance from her family.    She has a history of urinary tract infections and was advised by a physician assistant from urology to take daily cranberry Ellura. However, she is concerned about the potential for cranberry to contribute to kidney stones due to oxalates. Her recent urine sample was normal, and she is balancing her dietary intake to manage both UTIs and the risk of kidney  stones.    There is a discrepancy in blood pressure readings between her home machine and those taken in the clinic. Her home machine, which is about four to five years old, shows higher readings compared to the clinic's measurements. She is considering replacing her home blood pressure monitor.    She manages constipation with small doses of Miralax, taken twice daily. She has a history of irritable bowel syndrome, which contributes to her constipation. Her granddaughter, a dietitian, advised caution with Miralax due to potential electrolyte imbalances.    She is concerned about her potassium levels, which were low during her recent hospital stay. She was prescribed potassium supplements but is cautious about taking them alongside other medications like diuretics and aspirin. She is mindful of her dietary potassium intake, consuming foods like avocados and broccoli, while avoiding citrus and spinach due to kidney stone concerns.    She is interested in pharmacogenomic testing due to past difficulties with medications and is curious about how her genetic makeup might affect her response to medications, particularly given her history of medication side effects.    She inquires about the possibility of acupuncture for managing UTIs, having heard about its use in other conditions. She is also interested in exploring alternative treatments for her various health concerns.    Review of Systems   All other systems reviewed and are negative.    Problem List[1]    Isovue [isovue-m], Desipramine hcl, Omeprazole, and Prednisone    Medications - Current[2]    Short Social Hx on File[3]    Physical Exam  /60 (BP Location: Left arm, Patient Position: Sitting, Cuff Size: adult)   Pulse 81   Temp 98.1 °F (36.7 °C) (Temporal)   Resp 16   Ht 4' 11\" (1.499 m)   Wt 104 lb 3.2 oz (47.3 kg)   SpO2 99%   BMI 21.05 kg/m²    GENERAL: Alert and oriented, in no apparent distress.  NECK: Carotid arteries 2+ bilaterally. No  palpable thyroid. No palpable lymph nodes in upper neck fossa.  CHEST: Lungs clear to auscultation bilaterally.  CARDIOVASCULAR: Regular rate and rhythm, normal S1 and S2, no murmurs.  ABDOMEN: Abdomen soft, non-distended, non-tender. Normal active bowel sounds. No pain to palpation over left and right flanks.  EXTREMITIES: No peripheral edema in lower extremities. Pulses normal in lower extremities.      Results  LABS  Urinalysis: Normal (04/15/2025)  Urine culture: Bacteria present (04/03/2025)      Assessment & Plan  Hypokalemia  Recent hypokalemia. Discharged with potassium supplements. Discussed dietary potassium sources and advised continuation of supplements until lab review.  - Order blood test to check potassium levels. Will add magnesium level to rule out hypomagnesemia as a potential cause of hypokalemia. Folic acid deficiency needs to be ruled out as well.  - Continue potassium supplements until lab results are reviewed.  - Monitor dietary potassium intake.  - There is small amount of Miralax use, but I don't think this is enough to contribute to hypokalemia.  - There is hydrochlorothiazide at the 12.5 mg daily dose. Thiazide diuretics can be implicated too in hypokalemia. Will keep it for now as her potassium has normalized.     Urinary Tract Infection (UTI)  Recent UTI resolved. Discussed cranberry supplements for prevention and risk of kidney stones from oxalates. Advised managing dietary oxalate intake. Explored acupuncture for prevention.  - Hold off on daily cranberry supplements for UTI prevention.  - Manage dietary oxalate intake to prevent kidney stones.  - Explore acupuncture as a potential preventive measure. Dr. Hines to connect with Kris Vera and [place].    Tachycardia  Episodes of tachycardia with heart rate 110-120 bpm post-hospital discharge. Previous Holter monitor negative for supraventricular tachycardia. PTSD considered as a contributing factor. Advised emergency care if heart  rate reaches 120-130 bpm with symptoms.  - Consider mobile cardiac telemetry for 7-14 days if tachycardia recurs.  - Educated on when to seek emergency care for tachycardia.    Hypertension  Home blood pressure readings higher than clinic. Discussed potential inaccuracy of home monitor due to age. Recommended calibration or replacement. Managing with lifestyle modifications.  - Replace home blood pressure monitor.  - Continue monitoring blood pressure at home.  - Consider lifestyle modifications for blood pressure management. She is generally motivated to maintain physical activity.    Constipation  Chronic constipation possibly related to IBS. Using Miralax daily. Discussed potential electrolyte impact and dietary adjustments for bowel function. Advised considering legumes for fiber.  - Continue Miralax in small doses for bowel support.  - Consider dietary adjustments including legumes for fiber.  - Evaluate alternative bowel support options if needed.    Fatigue  She continues to have sub-optimal vitality. She does, however, continue to have goals of lifestyle optimization including physical, dietary, and metabolic health.  - Check fatigue labs.  - Support given.    General Health Maintenance  Considering COVID-19 booster and pharmacogenomic testing. Recommended booster for individuals over 65. Explained pharmacogenomic testing benefits.  - Administer COVID-19 booster shot.  - Initiate pharmacogenomic testing through [facility].     Follow-up  Follow-up lab tests planned to evaluate potassium levels and other metrics related to fatigue. Will review results to adjust treatment plans.  - Schedule follow-up lab tests for potassium, B12, iron, and folic acid levels.  - Review lab results and adjust treatment plan accordingly.       Lam Hines MD  [facility] Physician  Diplomate, American Board of Internal Medicine  Member, American College of Lifestyle Medicine  Member, American Association for  Physician Leadership  [address], [address], [place] [address]  [phone] (phone); [phone] (fax)  [email or web address]              [1]   Patient Active Problem List  Diagnosis    ARPIT (generalized anxiety disorder)    Adenomatous polyp of colon, unspecified part of colon    History of colon polyps    Benign cyst of right kidney    Osteoporosis    Chronic rhinosinusitis    Angiomyolipoma of left kidney    Low-tension glaucoma of both eyes, severe stage    Mild intermittent asthma without complication (HCC)    History of TIA (transient ischemic attack) x 2    Age-related nuclear cataract of both eyes    Pure hypercholesterolemia    Chronic pericardial effusion (HCC)    Idiopathic hypercalciuria    Lumbar spondylosis    Levoscoliosis of thoracolumbar spine    Prediabetes    Dry eye syndrome of both eyes    Neurogenic cough, chronic    Bilateral carpal tunnel syndrome    Functional gastrointestinal disorder    Bilateral, nonobstructive, chronic nephrolithiasis    Disorder of vocal cord    Elevated lipoprotein(a)    Other specified dermatitis (Eyelid dermatitis)    Family history of heart disease    Thoracic aorta atherosclerosis - seen on 12/26/24 Chest X-ray    Hyperglycemia   [2]   Current Outpatient Medications:     estradiol 0.1 MG/GM Vaginal Cream, 3 (three) times a week. APPLY TO URETHRA, Disp: , Rfl:     potassium citrate 10 MEQ (1080 MG) Oral Tab CR, Take 1 tablet (10 mEq total) by mouth in the morning and 1 tablet (10 mEq total) before bedtime. (Patient taking differently: Take 1 tablet (10 mEq total) by mouth daily.), Disp: 60 tablet, Rfl: 1    gabapentin 100 MG Oral Cap, Take 1 capsule (100 mg total) by mouth nightly., Disp: , Rfl:     Calcium Citrate Does not apply Powder, Take 600 mg by mouth every morning. ½ teaspoonful (600 mg total), Disp: , Rfl:     cholecalciferol 50 MCG (2000 UT) Oral Cap, Take 1 capsule (2,000 Units total) by mouth  daily., Disp: , Rfl:     cycloSPORINE 0.05 % Ophthalmic Emulsion, Place 1 drop into both eyes 2 (two) times daily., Disp: , Rfl:     Omega-3 Fatty Acids (FISH OIL OR), Take 1 capsule by mouth daily., Disp: , Rfl:     polyethylene glycol, PEG 3350, (MIRALAX) 17 GM/SCOOP Oral Powder, Take 5.7 g by mouth daily as needed (for constipation)., Disp: , Rfl:     simethicone 80 MG Oral Chew Tab, Chew 1-2 tablets ( mg total) by mouth every 6 (six) hours as needed for FLATULENCE., Disp: , Rfl:     Eyelid Cleansers (OCUSOFT LID SCRUB ORIGINAL EX), Apply 1 Pump topically nightly as needed (to both eyelids)., Disp: , Rfl:     atorvastatin 20 MG Oral Tab, Take 1 tablet (20 mg total) by mouth nightly., Disp: 90 tablet, Rfl: 3    hydroCHLOROthiazide 12.5 MG Oral Tab, Take 1 tablet (12.5 mg total) by mouth every morning., Disp: 90 tablet, Rfl: 3    sucralfate 1 g Oral Tab, Take 1 tablet (1 g total) by mouth every 3 (three) days. Use on days when taking Aspirin, Disp: , Rfl:     Azelastine HCl 137 MCG/SPRAY Nasal Solution, 2 sprays by Each Nare route 2 (two) times daily., Disp: , Rfl:     aspirin 81 MG Oral Tab EC, Take 1 tablet (81 mg total) by mouth every 3 (three) days., Disp: , Rfl:     FAMOTIDINE 40 MG Oral Tab, TAKE 1 TABLET BY MOUTH EVERY DAY, Disp: 90 tablet, Rfl: 0    Probiotic Product (PROBIOTIC DAILY OR), Take 3 capsules by mouth daily with lunch., Disp: , Rfl:   [3]   Social History  Socioeconomic History    Marital status:    Tobacco Use    Smoking status: Never    Smokeless tobacco: Never   Vaping Use    Vaping status: Never Used   Substance and Sexual Activity    Alcohol use: No     Alcohol/week: 0.0 standard drinks of alcohol    Drug use: No    Sexual activity: Never     Partners: Male   Other Topics Concern    Caffeine Concern No    Exercise Yes     Comment: lots   Social History Narrative    : 1971    Children: 3    Exercise: walks, [place] less so,     Employment: retired reading  specialist    Caffeine intake: minimal         Social Drivers of Health     Food Insecurity: No Food Insecurity (4/15/2025)    NCSS - Food Insecurity     Worried About Running Out of Food in the Last Year: No     Ran Out of Food in the Last Year: No   Transportation Needs: No Transportation Needs (4/15/2025)    NCSS - Transportation     Lack of Transportation: No   Housing Stability: Not At Risk (4/15/2025)    NCSS - Housing/Utilities     Has Housing: Yes     Worried About Losing Housing: No     Unable to Get Utilities: No   Recent Concern: Housing Stability - At Risk (4/7/2025)    NCSS - Housing/Utilities     Has Housing: No     Worried About Losing Housing: No     Unable to Get Utilities: No

## 2025-04-16 PROBLEM — R79.89 AZOTEMIA: Status: RESOLVED | Noted: 2025-04-07 | Resolved: 2025-04-16

## 2025-04-16 PROBLEM — E87.6 HYPOKALEMIA: Status: RESOLVED | Noted: 2025-04-07 | Resolved: 2025-04-16

## 2025-04-16 PROBLEM — I10 PRIMARY HYPERTENSION: Status: ACTIVE | Noted: 2025-04-16

## 2025-04-16 PROBLEM — R30.0 DYSURIA: Status: RESOLVED | Noted: 2025-04-07 | Resolved: 2025-04-16

## 2025-04-17 ENCOUNTER — APPOINTMENT (OUTPATIENT)
Facility: CLINIC | Age: 75
End: 2025-04-17
Payer: MEDICARE

## 2025-04-17 ENCOUNTER — TELEPHONE (OUTPATIENT)
Facility: CLINIC | Age: 75
End: 2025-04-17

## 2025-04-17 DIAGNOSIS — R53.83 OTHER FATIGUE: ICD-10-CM

## 2025-04-17 LAB
ALBUMIN SERPL-MCNC: 4.4 G/DL (ref 3.2–4.8)
ALBUMIN/GLOB SERPL: 2 {RATIO} (ref 1–2)
ALP LIVER SERPL-CCNC: 36 U/L (ref 55–142)
ALT SERPL-CCNC: 14 U/L (ref 10–49)
ANION GAP SERPL CALC-SCNC: 10 MMOL/L (ref 0–18)
AST SERPL-CCNC: 21 U/L (ref ?–34)
BASOPHILS # BLD AUTO: 0.05 X10(3) UL (ref 0–0.2)
BASOPHILS NFR BLD AUTO: 1.1 %
BILIRUB SERPL-MCNC: 0.8 MG/DL (ref 0.2–1.1)
BUN BLD-MCNC: 15 MG/DL (ref 9–23)
CALCIUM BLD-MCNC: 9.7 MG/DL (ref 8.7–10.6)
CHLORIDE SERPL-SCNC: 100 MMOL/L (ref 98–112)
CO2 SERPL-SCNC: 29 MMOL/L (ref 21–32)
CREAT BLD-MCNC: 0.66 MG/DL (ref 0.55–1.02)
DEPRECATED HBV CORE AB SER IA-ACNC: 51 NG/ML (ref 50–306)
EGFRCR SERPLBLD CKD-EPI 2021: 91 ML/MIN/1.73M2 (ref 60–?)
EOSINOPHIL # BLD AUTO: 0.06 X10(3) UL (ref 0–0.7)
EOSINOPHIL NFR BLD AUTO: 1.3 %
ERYTHROCYTE [DISTWIDTH] IN BLOOD BY AUTOMATED COUNT: 14.8 %
FASTING STATUS PATIENT QL REPORTED: YES
FOLATE SERPL-MCNC: 18.2 NG/ML (ref 5.4–?)
GLOBULIN PLAS-MCNC: 2.2 G/DL (ref 2–3.5)
GLUCOSE BLD-MCNC: 98 MG/DL (ref 70–99)
HCT VFR BLD AUTO: 39.2 % (ref 35–48)
HGB BLD-MCNC: 13 G/DL (ref 12–16)
IMM GRANULOCYTES # BLD AUTO: 0.01 X10(3) UL (ref 0–1)
IMM GRANULOCYTES NFR BLD: 0.2 %
LYMPHOCYTES # BLD AUTO: 1.79 X10(3) UL (ref 1–4)
LYMPHOCYTES NFR BLD AUTO: 38.5 %
MAGNESIUM SERPL-MCNC: 1.9 MG/DL (ref 1.6–2.6)
MCH RBC QN AUTO: 28.3 PG (ref 26–34)
MCHC RBC AUTO-ENTMCNC: 33.2 G/DL (ref 31–37)
MCV RBC AUTO: 85.4 FL (ref 80–100)
MONOCYTES # BLD AUTO: 0.41 X10(3) UL (ref 0.1–1)
MONOCYTES NFR BLD AUTO: 8.8 %
NEUTROPHILS # BLD AUTO: 2.33 X10 (3) UL (ref 1.5–7.7)
NEUTROPHILS # BLD AUTO: 2.33 X10(3) UL (ref 1.5–7.7)
NEUTROPHILS NFR BLD AUTO: 50.1 %
OSMOLALITY SERPL CALC.SUM OF ELEC: 289 MOSM/KG (ref 275–295)
PLATELET # BLD AUTO: 288 10(3)UL (ref 150–450)
POTASSIUM SERPL-SCNC: 3.6 MMOL/L (ref 3.5–5.1)
PROT SERPL-MCNC: 6.6 G/DL (ref 5.7–8.2)
RBC # BLD AUTO: 4.59 X10(6)UL (ref 3.8–5.3)
SODIUM SERPL-SCNC: 139 MMOL/L (ref 136–145)
VIT B12 SERPL-MCNC: 1874 PG/ML (ref 211–911)
WBC # BLD AUTO: 4.7 X10(3) UL (ref 4–11)

## 2025-04-17 PROCEDURE — 82728 ASSAY OF FERRITIN: CPT | Performed by: INTERNAL MEDICINE

## 2025-04-17 PROCEDURE — 85025 COMPLETE CBC W/AUTO DIFF WBC: CPT | Performed by: INTERNAL MEDICINE

## 2025-04-17 PROCEDURE — 82607 VITAMIN B-12: CPT | Performed by: INTERNAL MEDICINE

## 2025-04-17 PROCEDURE — 82746 ASSAY OF FOLIC ACID SERUM: CPT | Performed by: INTERNAL MEDICINE

## 2025-04-17 PROCEDURE — 80053 COMPREHEN METABOLIC PANEL: CPT | Performed by: INTERNAL MEDICINE

## 2025-04-17 PROCEDURE — 83735 ASSAY OF MAGNESIUM: CPT | Performed by: INTERNAL MEDICINE

## 2025-04-17 NOTE — TELEPHONE ENCOUNTER
Lam Hines MD  [facility] Clinical Staff  1. Patient is interested in Pharmacogenomic Testing thru [facility].  Please facilitate.  2. She is interested in polygenic risk score testing.  Please facilitate.    Dr. MADRIGAL    Order placed for both test will inform patient.

## 2025-05-02 PROBLEM — G45.9 TRANSIENT ISCHEMIC ATTACK: Status: ACTIVE | Noted: 2019-12-26

## 2025-05-07 ENCOUNTER — TELEPHONE (OUTPATIENT)
Facility: CLINIC | Age: 75
End: 2025-05-07

## 2025-05-07 ENCOUNTER — HOSPITAL ENCOUNTER (EMERGENCY)
Facility: HOSPITAL | Age: 75
Discharge: HOME | End: 2025-05-07
Attending: EMERGENCY MEDICINE
Payer: MEDICARE

## 2025-05-07 VITALS
SYSTOLIC BLOOD PRESSURE: 125 MMHG | OXYGEN SATURATION: 100 % | DIASTOLIC BLOOD PRESSURE: 72 MMHG | HEART RATE: 78 BPM | RESPIRATION RATE: 18 BRPM | TEMPERATURE: 98 F

## 2025-05-07 DIAGNOSIS — I10 HYPERTENSION, UNSPECIFIED TYPE: Primary | ICD-10-CM

## 2025-05-07 LAB
ALBUMIN SERPL-MCNC: 4.8 G/DL (ref 3.2–4.8)
ALBUMIN/GLOB SERPL: 1.9 {RATIO} (ref 1–2)
ALP LIVER SERPL-CCNC: 44 U/L (ref 55–142)
ALT SERPL-CCNC: 16 U/L (ref 10–49)
ANION GAP SERPL CALC-SCNC: 13 MMOL/L (ref 0–18)
AST SERPL-CCNC: 22 U/L (ref ?–34)
BASOPHILS # BLD AUTO: 0.04 X10(3) UL (ref 0–0.2)
BASOPHILS NFR BLD AUTO: 0.8 %
BILIRUB SERPL-MCNC: 1 MG/DL (ref 0.2–1.1)
BUN BLD-MCNC: 10 MG/DL (ref 9–23)
CALCIUM BLD-MCNC: 9.3 MG/DL (ref 8.7–10.6)
CHLORIDE SERPL-SCNC: 102 MMOL/L (ref 98–112)
CO2 SERPL-SCNC: 24 MMOL/L (ref 21–32)
CREAT BLD-MCNC: 0.76 MG/DL (ref 0.55–1.02)
EGFRCR SERPLBLD CKD-EPI 2021: 81 ML/MIN/1.73M2 (ref 60–?)
EOSINOPHIL # BLD AUTO: 0.01 X10(3) UL (ref 0–0.7)
EOSINOPHIL NFR BLD AUTO: 0.2 %
ERYTHROCYTE [DISTWIDTH] IN BLOOD BY AUTOMATED COUNT: 14.4 %
GLOBULIN PLAS-MCNC: 2.5 G/DL (ref 2–3.5)
GLUCOSE BLD-MCNC: 65 MG/DL (ref 70–99)
HCT VFR BLD AUTO: 43.9 % (ref 35–48)
HGB BLD-MCNC: 14.6 G/DL (ref 12–16)
IMM GRANULOCYTES # BLD AUTO: 0.01 X10(3) UL (ref 0–1)
IMM GRANULOCYTES NFR BLD: 0.2 %
LIPASE SERPL-CCNC: 27 U/L (ref 12–53)
LYMPHOCYTES # BLD AUTO: 1.22 X10(3) UL (ref 1–4)
LYMPHOCYTES NFR BLD AUTO: 23.8 %
MCH RBC QN AUTO: 28.4 PG (ref 26–34)
MCHC RBC AUTO-ENTMCNC: 33.3 G/DL (ref 31–37)
MCV RBC AUTO: 85.4 FL (ref 80–100)
MONOCYTES # BLD AUTO: 0.27 X10(3) UL (ref 0.1–1)
MONOCYTES NFR BLD AUTO: 5.3 %
NEUTROPHILS # BLD AUTO: 3.57 X10 (3) UL (ref 1.5–7.7)
NEUTROPHILS # BLD AUTO: 3.57 X10(3) UL (ref 1.5–7.7)
NEUTROPHILS NFR BLD AUTO: 69.7 %
OSMOLALITY SERPL CALC.SUM OF ELEC: 285 MOSM/KG (ref 275–295)
PLATELET # BLD AUTO: 268 10(3)UL (ref 150–450)
POTASSIUM SERPL-SCNC: 3.5 MMOL/L (ref 3.5–5.1)
PROT SERPL-MCNC: 7.3 G/DL (ref 5.7–8.2)
RBC # BLD AUTO: 5.14 X10(6)UL (ref 3.8–5.3)
SODIUM SERPL-SCNC: 139 MMOL/L (ref 136–145)
WBC # BLD AUTO: 5.1 X10(3) UL (ref 4–11)

## 2025-05-07 PROCEDURE — 36415 COLL VENOUS BLD VENIPUNCTURE: CPT

## 2025-05-07 PROCEDURE — 85025 COMPLETE CBC W/AUTO DIFF WBC: CPT | Performed by: EMERGENCY MEDICINE

## 2025-05-07 PROCEDURE — 99283 EMERGENCY DEPT VISIT LOW MDM: CPT

## 2025-05-07 PROCEDURE — 83690 ASSAY OF LIPASE: CPT

## 2025-05-07 PROCEDURE — 83690 ASSAY OF LIPASE: CPT | Performed by: EMERGENCY MEDICINE

## 2025-05-07 PROCEDURE — 80053 COMPREHEN METABOLIC PANEL: CPT | Performed by: EMERGENCY MEDICINE

## 2025-05-07 PROCEDURE — 80053 COMPREHEN METABOLIC PANEL: CPT

## 2025-05-07 PROCEDURE — 85025 COMPLETE CBC W/AUTO DIFF WBC: CPT

## 2025-05-07 NOTE — ED QUICK NOTES
Rounding Completed    Plan of Care reviewed. Waiting for blood results, blood pressure monitoring .  Elimination needs assessed.      Bed is locked and in lowest position. Call light within reach.

## 2025-05-07 NOTE — ED PROVIDER NOTES
Patient Seen in: [facility] Emergency Department       The following individual(s) verbally consented to be recorded using ambient AI listening technology and understand that they can each withdraw their consent to this listening technology at any point by asking the clinician to turn off or pause the recording:    Patient name: Adilene Garvin  Additional names:  none      History     Chief Complaint   Patient presents with    Hypertension     Stated Complaint: sent from outpt endoscopy for elevated BP, \" not feeling well' \"  I have potass*    Subjective:   HPI           Adilene Garvin is a 74 year old female who presents with elevated blood pressure and pulse during a colonoscopy and endoscopy.    During a colonoscopy and endoscopy today, her blood pressure and pulse were elevated. She has undergone these procedures multiple times before.    She denies chest pain or breathing problems but experiences shaking. She has a history of occasional episodes of tachycardia and elevated blood pressure, which she notes as usually being the first sign of 'something else.'    Last night, she experienced a 'tacky episode' during the middle of the night, prompting her to call the doctor. She feels 'pretty good' at the time of the conversation, although she has not eaten.    She is involved with therapy dogs, indicating a possible role as a caregiver or volunteer in this capacity.       Objective:     Past Medical History:    Anxiety state, unspecified    ASTHMA    BPPV (benign paroxysmal positional vertigo)    Cervicalgia    Depression    Esophageal reflux    Fibrocystic disease of breast    GLAUCOMA NOS    High blood pressure    High cholesterol    IBS (irritable bowel syndrome)    Kidney mass    Lipoma    MENOPAUSE    Mixed hyperlipidemia    Osteopenia    Osteoporosis    Palpitations    Pre-diabetes    Proctalgia fugax    Tietze's disease    TMJ disorder    VITAMIN D DEFICIENCY NOS              Past Surgical  History:   Procedure Laterality Date    Colonoscopy N/A 8/22/2016    Colonic Polyps (adenomas)Procedure: COLONOSCOPY;  Surgeon: Marc Houser MD;  Location:  ENDOSCOPY    Colonoscopy  07/10/2020    TI wnl, sigmoid polyp    Colonoscopy,biopsy  2005    adenoma    Colonoscopy,diagnostic  1/12/10    wnl, hemorrhoids    D & c  2009    post men bleeding    Egd  07/10/2020    esophageal, gastric and small bowel bx taken    Egd N/A 7/10/2020    Procedure: ESOPHAGOGASTRODUODENOSCOPY, COLONOSCOPY, POSSIBLE BIOPSY, POSSIBLE POLYPECTOMY 20321, [id number];  Surgeon: Marc Houser MD;  [place] [facility] [place] [facility]    G-esoph reflx tst w/electrod  3/19-3/21/13    48 Robledo on Dexilant/Zantac shows 0 reflux in a 48 hour period    Hemorrhoidectomy      Hernia surgery  08/06/2020    Repair incarcerated femoral hernia    Upper gi endoscopy,biopsy  6/5/06    wnl, Gastric and SB Bx wnl    Upper gi endoscopy,biopsy  1993    gastritis, bx for H. pylori negtive    Upper gi endoscopy,biopsy  1/12/10    wnl, Gastric and SB Bx wnl    Upper gi endoscopy,diagnosis  2001    wnl    Upper gi endoscopy,diagnosis  3/19/13    wnl, small hiatal hernia, proximal and distal esophageal bx normal    Upper gi endoscopy,diagnosis  02/01/2018    normal, esophageal, gastric and SB Bx negative                Social History     Socioeconomic History    Marital status:    Tobacco Use    Smoking status: Never    Smokeless tobacco: Never   Vaping Use    Vaping status: Never Used   Substance and Sexual Activity    Alcohol use: No     Alcohol/week: 0.0 standard drinks of alcohol    Drug use: No    Sexual activity: Never     Partners: Male   Other Topics Concern    Caffeine Concern No    Exercise Yes     Comment: lots   Social History Narrative    : 1971    Children: 3    Exercise: walks, [employer] less so,     Employment: retired     Caffeine intake: minimal         Social Drivers of Health     Food  Insecurity: No Food Insecurity (4/15/2025)    NCSS - Food Insecurity     Worried About Running Out of Food in the Last Year: No     Ran Out of Food in the Last Year: No   Transportation Needs: No Transportation Needs (4/15/2025)    NCSS - Transportation     Lack of Transportation: No   Housing Stability: Not At Risk (4/15/2025)    NCSS - Housing/Utilities     Has Housing: Yes     Worried About Losing Housing: No     Unable to Get Utilities: No   Recent Concern: Housing Stability - At Risk (4/7/2025)    NCSS - Housing/Utilities     Has Housing: No     Worried About Losing Housing: No     Unable to Get Utilities: No                                Physical Exam     ED Triage Vitals [05/07/25 1317]   /86   Pulse 92   Resp 18   Temp 97.5 °F (36.4 °C)   Temp src    SpO2 100 %   O2 Device None (Room air)       Current Vitals:   Vital Signs  BP: 125/72  Pulse: 78  Resp: 18  Temp: 97.5 °F (36.4 °C)  MAP (mmHg): 86    Oxygen Therapy  SpO2: 100 %  O2 Device: None (Room air)        Physical Exam  Vitals and nursing note reviewed.   Constitutional:       Appearance: She is well-developed.   HENT:      Head: Normocephalic.   Cardiovascular:      Rate and Rhythm: Normal rate and regular rhythm.      Heart sounds: Normal heart sounds. No murmur heard.  Pulmonary:      Effort: Pulmonary effort is normal. No respiratory distress.      Breath sounds: Normal breath sounds.   Abdominal:      General: Bowel sounds are normal.      Palpations: Abdomen is soft.      Tenderness: There is no abdominal tenderness. There is no rebound.   Musculoskeletal:         General: No tenderness. Normal range of motion.      Cervical back: Normal range of motion and neck supple.   Lymphadenopathy:      Cervical: No cervical adenopathy.   Skin:     General: Skin is warm and dry.      Findings: No rash.   Neurological:      Mental Status: She is alert and oriented to person, place, and time.      Sensory: No sensory deficit.      Comments: Patient  has no motor or sensory deficit.  Patient is able to ambulate normally.  Patient has normal speech and normal cognition.          ED Course     Labs Reviewed   COMP METABOLIC PANEL (14) - Abnormal; Notable for the following components:       Result Value    Glucose 65 (*)     Alkaline Phosphatase 44 (*)     All other components within normal limits   LIPASE - Normal   CBC WITH DIFFERENTIAL WITH PLATELET   RAINBOW DRAW LAVENDER   RAINBOW DRAW LIGHT GREEN       ED Course as of 05/07/25 1550  ------------------------------------------------------------  Time: 05/07 1546  Value: CBC With Differential With Platelet  Comment: Unremarkable    ------------------------------------------------------------  Time: 05/07 1546  Value: Comp Metabolic Panel (14)(!)  Comment: Unremarkable                      MDM      Patient comes to the emergency department after she was noted to be hypertensive in preparation for an ambulatory, elective colonoscopy and endoscopy.  Patient had no acute symptoms other than some generalized weakness which patient attributed to her colonoscopy prep.  Upon arrival here in our emergency department, the patient's blood pressure was normal and patient had no acute symptoms.  Patient's physical exam was also unremarkable.  Patient had no clinical evidence of any hypertensive crisis.  Patient's lab work was also unremarkable.  Patient was discharged home with instructed follow-up with primary care physician.  She will have her endoscopy rescheduled.        Medical Decision Making      Disposition and Plan     Clinical Impression:  1. Hypertension, unspecified type         Disposition:  Discharge  5/7/2025  2:57 pm    Follow-up:  Lam Hines MD  [address] [place] [address]  [phone]    Follow up            Medications Prescribed:  Discharge Medication List as of 5/7/2025  3:02 PM          Supplementary Documentation:

## 2025-05-07 NOTE — TELEPHONE ENCOUNTER
ER documentation reviewed.       Lam Hines MD  [facility] Physician  Diplomate, American Board of Internal Medicine  Member, American College of Lifestyle Medicine  Member, American Association for Physician Leadership  [address], [place] [address]  [phone] (phone); [phone] (fax)  [email or web address]

## 2025-05-07 NOTE — ED INITIAL ASSESSMENT (HPI)
sent from outpt endoscopy for elevated BP, denies hx of HTN. Patient states she feels shaky after being NPO. Would not complete scopes in endo d/t high BP. 155/86 in triage

## 2025-05-07 NOTE — TELEPHONE ENCOUNTER
Pt was sent to the ER did not get the colonoscopy or endoscopy done today. The anesthesiologist was concern as her hear rate and blood pressure were elevated.

## 2025-05-08 ENCOUNTER — PATIENT MESSAGE (OUTPATIENT)
Facility: CLINIC | Age: 75
End: 2025-05-08

## 2025-05-13 ENCOUNTER — OFFICE VISIT (OUTPATIENT)
Facility: CLINIC | Age: 75
End: 2025-05-13
Payer: MEDICARE

## 2025-05-13 ENCOUNTER — TELEPHONE (OUTPATIENT)
Facility: CLINIC | Age: 75
End: 2025-05-13

## 2025-05-13 VITALS
OXYGEN SATURATION: 99 % | WEIGHT: 101.88 LBS | HEART RATE: 68 BPM | BODY MASS INDEX: 20.54 KG/M2 | RESPIRATION RATE: 16 BRPM | HEIGHT: 59 IN | SYSTOLIC BLOOD PRESSURE: 102 MMHG | DIASTOLIC BLOOD PRESSURE: 60 MMHG | TEMPERATURE: 98 F

## 2025-05-13 DIAGNOSIS — E87.6 HYPOKALEMIA: ICD-10-CM

## 2025-05-13 DIAGNOSIS — R00.0 TACHYCARDIA: Primary | ICD-10-CM

## 2025-05-13 DIAGNOSIS — I95.9 HYPOTENSION, UNSPECIFIED HYPOTENSION TYPE: ICD-10-CM

## 2025-05-13 LAB
ANION GAP SERPL CALC-SCNC: 8 MMOL/L (ref 0–18)
BUN BLD-MCNC: 15 MG/DL (ref 9–23)
CALCIUM BLD-MCNC: 9.6 MG/DL (ref 8.7–10.6)
CHLORIDE SERPL-SCNC: 100 MMOL/L (ref 98–112)
CO2 SERPL-SCNC: 29 MMOL/L (ref 21–32)
CREAT BLD-MCNC: 0.69 MG/DL (ref 0.55–1.02)
EGFRCR SERPLBLD CKD-EPI 2021: 90 ML/MIN/1.73M2 (ref 60–?)
FASTING STATUS PATIENT QL REPORTED: NO
GLUCOSE BLD-MCNC: 98 MG/DL (ref 70–99)
OSMOLALITY SERPL CALC.SUM OF ELEC: 285 MOSM/KG (ref 275–295)
POTASSIUM SERPL-SCNC: 3.7 MMOL/L (ref 3.5–5.1)
SODIUM SERPL-SCNC: 137 MMOL/L (ref 136–145)

## 2025-05-13 PROCEDURE — 80048 BASIC METABOLIC PNL TOTAL CA: CPT | Performed by: INTERNAL MEDICINE

## 2025-05-13 NOTE — TELEPHONE ENCOUNTER
Lam Hines MD  [facility] Clinical Staff  Please call over to [facility].  This patient has intermittent episodes of tachycardia, possibly related to underlying SVT.  I would like her to be seen by Dr. Qureshi.  How is his availability?  Also, if there are other electrophysiologist with sooner availability, then the patient is open to that option as well.    Dr. ROHAN Segura scheduled by staff: tomorrow at noon with Dr. Cortez

## 2025-05-13 NOTE — PATIENT INSTRUCTIONS
VISIT SUMMARY:    You came in today due to episodes of rapid heart rate (tachycardia) that you experienced following your colonoscopy preparation. You also had low blood sugar, borderline low potassium levels, and high blood pressure during these episodes. We discussed your history of tachycardia and the challenges you've faced with previous medications.    YOUR PLAN:    -TACHYCARDIA: Tachycardia means a fast heart rate. You have been experiencing intermittent episodes of this, possibly due to a condition called supraventricular tachycardia (SVT). We will refer you to an electrophysiologist, Dr. Qureshi, for further evaluation and possibly an electrophysiology study. We may also use mobile cardiac telemetry for 7-14 days to monitor your heart rhythm. We will review your previous echocardiogram and nuclear stress test results.    -HYPOKALEMIA: Hypokalemia means low potassium levels in your blood. Your recent borderline low potassium may be due to your bowel preparation and hydrochlorothiazide medication. We will test your potassium levels again today. Continue eating potassium-rich foods like bananas, lettuce, and kale. If your potassium levels remain low, we may need to adjust your hydrochlorothiazide medication.    -HYPOTENSION: Hypotension means low blood pressure. Your blood pressure readings have been variable, and this may be influenced by your hydrochlorothiazide medication. We will monitor your blood pressure regularly and may adjust your medication based on your potassium levels and blood pressure trends.    -GLAUCOMA: Glaucoma is a condition that can cause damage to your eye's optic nerve and worsen over time. You have a history of glaucoma that may have been exacerbated by previous use of Toprol. We will monitor for any changes in your vision or symptoms related to glaucoma.    -GENERAL HEALTH MAINTENANCE: We discussed dietary modifications to help manage your potassium levels and overall health. Consider  consulting with a dietitian for dietary guidance and think about taking a plant-based multivitamin supplement.    INSTRUCTIONS:    Please follow up with Dr. Qureshi, the electrophysiologist, for further evaluation of your tachycardia. We will also need to repeat your potassium level test today. Monitor your blood pressure regularly and keep track of any changes in your vision or symptoms related to glaucoma. Consider consulting with a dietitian for dietary guidance and think about taking a plant-based multivitamin supplement.

## 2025-05-13 NOTE — PROGRESS NOTES
The 21st Century Cures Act makes medical notes like these available to patients in the interest of transparency. Please be advised this is a medical document. Medical documents are intended to carry relevant information, facts as evident, and the clinical opinion of the practitioner. The medical note is intended as peer to peer communication and may appear blunt or direct. It is written in medical language and may contain abbreviations or verbiage that are unfamiliar.           The following individual(s) verbally consented to be recorded using ambient AI listening technology and understand that they can each withdraw their consent to this listening technology at any point by asking the clinician to turn off or pause the recording:    Patient name: Adilene Garvin        Chief Complaint   Patient presents with    Follow - Up       History of Present Illness  Adilene Garvin is a 74 year old female who presents with episodes of tachycardia following colonoscopy preparation.    She experienced episodes of tachycardia following her colonoscopy preparation on May 7th. During the night, she became tachycardic and was unable to lower her heart rate. Her blood sugar was recorded at 64 mg/dL, which was unusually low for her. Her potassium level was borderline at 3.5 mmol/L, and her blood pressure was elevated at 157 mmHg. Due to these concerns, the anesthesiologist advised against proceeding with the colonoscopy, and she was directed to the ER at [facility].    After being discharged from the hospital, she experienced another episode of tachycardia at home around 4:30 to 5:00 PM, which persisted until about 9:00 to 10:00 PM. She attempted various maneuvers to alleviate the symptoms, such as the Valsalva maneuver, which eventually helped. On the following Sunday, she experienced another episode while at rest, which lasted three to four hours before resolving spontaneously. During these episodes, her heart rate was  recorded at 102 bpm while sitting and increased to 120-125 bpm upon standing or walking.    She has a history of tachycardia, previously managed with Toprol, which she had difficulty tolerating due to low blood pressure and subsequent glaucoma. She also mentioned a past episode of tachycardia related to PTSD after a stressful event in [place]. Her current medications include hydrochlorothiazide, which she suspects may be contributing to her low potassium levels. She is also taking potassium supplements, but her levels have been fluctuating.    No current infection, significant stress, or thyroid issues. She reports episodes of tachycardia with no atrial fibrillation detected on her KardiaMobile device.    Review of Systems   All other systems reviewed and are negative.    Problem List[1]    Isovue [isovue-m], Desipramine hcl, Omeprazole, and Prednisone    Medications - Current[2]    Short Social Hx on File[3]      Physical Exam  /60 (BP Location: Left arm, Patient Position: Sitting, Cuff Size: adult)   Pulse 68   Temp 97.9 °F (36.6 °C) (Temporal)   Resp 16   Ht 4' 11\" (1.499 m)   Wt 101 lb 14.4 oz (46.2 kg)   SpO2 99%   BMI 20.58 kg/m²    GENERAL: Alert and oriented, no apparent distress.  CHEST: Lungs clear to auscultation bilaterally.  CARDIOVASCULAR: Heart regular rate and rhythm. Distal pulses in the wrists normal.      Results  LABS  Blood Glucose: 64 mg/dL (05/07/2025)  Potassium: 3.5 mmol/L (05/07/2025)    DIAGNOSTIC  Nuclear Stress Test: Normal perfusion scan (05/09/2024)  Echocardiogram: Left ventricular ejection fraction 60-65% (07/31/2022)  KardiaMobile EKG: Tachycardia, 102 bpm, no atrial fibrillation (05/07/2025)      Assessment & Plan  Tachycardia  Intermittent tachycardia with possible SVT. No atrial fibrillation, infection, significant coronary artery disease, or hyperthyroidism. Normal previous nuclear stress test and echocardiogram. Symptoms include palpitations and shaking. Vagal  maneuvers provide temporary relief. Further electrophysiological evaluation needed.  - Refer to electrophysiologist for evaluation and possible electrophysiology study.  - Consider mobile cardiac telemetry for 7-14 days to monitor heart rhythm.  - Review previous echocardiogram and nuclear stress test results.    Hypokalemia  Recent borderline hypokalemia possibly due to bowel prep and hydrochlorothiazide. Increased dietary potassium intake.  - Order repeat potassium level test today.  - Continue dietary intake of potassium-rich foods such as bananas, lettuce, and kale.  - Consider adjusting hydrochlorothiazide if hypokalemia persists.    Hypotension  Variable blood pressure readings, recent 102/60 mmHg. Possible influence from hydrochlorothiazide. No symptoms reported.  - Monitor blood pressure regularly.  - Evaluate need for hydrochlorothiazide adjustment based on potassium levels and blood pressure trends.    General Health Maintenance  Discussed dietary modifications for potassium management and overall health. Considered multivitamin supplementation.  - Consult with dietetics for dietary guidance.  - Consider plant-based multivitamin supplementation.    RTC PRN or at next regularly scheduled OV.    She verbally agrees to and understands the plan as outlined above.  She was also afforded the time and opportunity to ask questions, which were then answered to the best of my ability.        Lam Hines MD  [facility] Physician  Diplomate, American Board of Internal Medicine  Member, American College of Lifestyle Medicine  Member, American Association for Physician Leadership  [address], [address] [address]  [phone] (phone); [phone] (fax)  [email or web address]              [1]   Patient Active Problem List  Diagnosis    ARPIT (generalized anxiety disorder)    Adenomatous polyp of colon, unspecified part of colon    History of colon  polyps    Benign cyst of right kidney    Osteoporosis    Chronic rhinosinusitis    Angiomyolipoma of left kidney    Low-tension glaucoma of both eyes, severe stage    Mild intermittent asthma without complication (HCC)    History of TIA (transient ischemic attack) x 2    Age-related nuclear cataract of both eyes    Pure hypercholesterolemia    Chronic pericardial effusion (HCC)    Idiopathic hypercalciuria    Lumbar spondylosis    Levoscoliosis of thoracolumbar spine    Prediabetes    Dry eye syndrome of both eyes    Neurogenic cough, chronic    Bilateral carpal tunnel syndrome    Functional gastrointestinal disorder    Bilateral, nonobstructive, chronic nephrolithiasis    Disorder of vocal cord    Elevated lipoprotein(a)    Other specified dermatitis (Eyelid dermatitis)    Family history of heart disease    Thoracic aorta atherosclerosis - seen on 12/26/24 Chest X-ray    Hyperglycemia    Primary hypertension    Transient ischemic attack   [2]   Current Outpatient Medications:     estradiol 0.1 MG/GM Vaginal Cream, 3 (three) times a week. APPLY TO URETHRA, Disp: , Rfl:     potassium citrate 10 MEQ (1080 MG) Oral Tab CR, Take 1 tablet (10 mEq total) by mouth in the morning and 1 tablet (10 mEq total) before bedtime. (Patient taking differently: Take 1 tablet (10 mEq total) by mouth daily.), Disp: 60 tablet, Rfl: 1    gabapentin 100 MG Oral Cap, Take 1 capsule (100 mg total) by mouth nightly., Disp: , Rfl:     Calcium Citrate Does not apply Powder, Take 600 mg by mouth every morning. ½ teaspoonful (600 mg total), Disp: , Rfl:     cholecalciferol 50 MCG (2000 UT) Oral Cap, Take 1 capsule (2,000 Units total) by mouth daily., Disp: , Rfl:     cycloSPORINE 0.05 % Ophthalmic Emulsion, Place 1 drop into both eyes 2 (two) times daily., Disp: , Rfl:     Omega-3 Fatty Acids (FISH OIL OR), Take 1 capsule by mouth daily., Disp: , Rfl:     polyethylene glycol, PEG 3350, (MIRALAX) 17 GM/SCOOP Oral Powder, Take 5.7 g by mouth daily  as needed (for constipation)., Disp: , Rfl:     simethicone 80 MG Oral Chew Tab, Chew 1-2 tablets ( mg total) by mouth every 6 (six) hours as needed for FLATULENCE., Disp: , Rfl:     Eyelid Cleansers (OCUSOFT LID SCRUB ORIGINAL EX), Apply 1 Pump topically nightly as needed (to both eyelids)., Disp: , Rfl:     atorvastatin 20 MG Oral Tab, Take 1 tablet (20 mg total) by mouth nightly., Disp: 90 tablet, Rfl: 3    hydroCHLOROthiazide 12.5 MG Oral Tab, Take 1 tablet (12.5 mg total) by mouth every morning., Disp: 90 tablet, Rfl: 3    aspirin 81 MG Oral Tab EC, Take 1 tablet (81 mg total) by mouth every 3 (three) days., Disp: , Rfl:     FAMOTIDINE 40 MG Oral Tab, TAKE 1 TABLET BY MOUTH EVERY DAY, Disp: 90 tablet, Rfl: 0    Probiotic Product (PROBIOTIC DAILY OR), Take 3 capsules by mouth daily with lunch., Disp: , Rfl:     PEG 3350-KCl-Na Bicarb-NaCl 420 g Oral Recon Soln, Take as directed by physician, Disp: 4000 mL, Rfl: 0    linaCLOtide (LINZESS) 72 MCG Oral Cap, Take 72 mcg by mouth every other day., Disp: 90 capsule, Rfl: 3    PEG 3350-KCl-Na Bicarb-NaCl 420 g Oral Recon Soln, Take as directed by physician., Disp: 4000 mL, Rfl: 0    sucralfate 1 g Oral Tab, Take 1 tablet (1 g total) by mouth every 3 (three) days. Use on days when taking Aspirin, Disp: , Rfl:   [3]   Social History  Socioeconomic History    Marital status:    Tobacco Use    Smoking status: Never    Smokeless tobacco: Never   Vaping Use    Vaping status: Never Used   Substance and Sexual Activity    Alcohol use: No     Alcohol/week: 0.0 standard drinks of alcohol    Drug use: No    Sexual activity: Never     Partners: Male   Other Topics Concern    Caffeine Concern No    Exercise Yes     Comment: lots   Social History Narrative    : 1971    Children: 3    Exercise: walks, [employer] less so,     Employment: retired     Caffeine intake: minimal         Social Drivers of Health     Food Insecurity: No Food  Insecurity (4/15/2025)    NCSS - Food Insecurity     Worried About Running Out of Food in the Last Year: No     Ran Out of Food in the Last Year: No   Transportation Needs: No Transportation Needs (4/15/2025)    NCSS - Transportation     Lack of Transportation: No   Housing Stability: Not At Risk (4/15/2025)    NCSS - Housing/Utilities     Has Housing: Yes     Worried About Losing Housing: No     Unable to Get Utilities: No   Recent Concern: Housing Stability - At Risk (4/7/2025)    NCSS - Housing/Utilities     Has Housing: No     Worried About Losing Housing: No     Unable to Get Utilities: No

## 2025-05-19 ENCOUNTER — TELEPHONE (OUTPATIENT)
Facility: CLINIC | Age: 75
End: 2025-05-19

## 2025-05-19 NOTE — TELEPHONE ENCOUNTER
Called Patient ([phone])    Patient uses the [employer]ingo and put on a new sensor Saturday night, and she is concerned about low blood sugars overnight  Patient questions if her Potassium Citrate 10 mEq taken bid is contributing    She provided the following readings:  Early  mornin at 0317  63 at 0347  64 at 0400  70 at 0542    Readings early this mornin at midnight  67 at 0427  70 at 0512  65-67 at 0600   72 at 0630      She checked her glucose while on the phone: 75    This morning at about 1130, she took her potassium and ate lunch of just over a cup of yogurt, 3 different types of seeds, 3/4 of a banana, probiotics   [id number] - she ate a very small piece of a brownie, and her sugar spiked to 195, but came down quickly.    She typically takes her potassium with her lunch and with a small snack in the evening  and 2100. The snack usually consists of a small plate of banana, cheese, nuts, and part of an avocado    Advised Patient that most of her readings are the low-end of normal and asked if she is experiencing any s/s of low blood sugar.  She states she isn't sure if she is symptomatic overnight as she is sleeping and doesn't wake up, but denies sx during the day    She states when she completed her 2025 BMP, she ate half a banana and an egg about 8 am, then had her blood drawn about 1030. Her sugar was 98, which felt low considering she ate prior    Asked Patient if she has made any significant changes to her diet since her 2025 A1c:  Component      Latest Ref Rng 3/3/2025   HEMOGLOBIN A1c      <5.7 % 6.3 (H)    ESTIMATED AVERAGE GLUCOSE      68 - 126 mg/dL 134 (H)       Legend:  (H) High    She denies other than adding Lentils and resuming Metamucil in the afternoon    Patient asking for PCP's thoughts    Please advise

## 2025-05-19 NOTE — TELEPHONE ENCOUNTER
Patient called back and forgot to let the nurse know that she has been taking her potassium every night but would like to start taking 2 out of the 3 nights.

## 2025-05-19 NOTE — TELEPHONE ENCOUNTER
Called Patient back for clarification.    She states she takes Aspirin every 3 nights and had previously communicated with Dr. Hines about not taking the Potassium the nights she takes the Aspirin, but she wants him to know that she has been taking the Potassium nightly without skipping any doses

## 2025-05-19 NOTE — TELEPHONE ENCOUNTER
Patient recently started taking potassium citrate 10 MEQ (1080 MG) Oral Tab C. When she puts on her lingo glucose sensor, it's showing that her glucose is down.  At night her glucose it in the 50 and 60's. She wants to know how low is too low. Please call to discuss.

## 2025-05-21 NOTE — TELEPHONE ENCOUNTER
Thomas phelps.       Lam Hines MD  [facility] Physician  Diplomate, American Board of Internal Medicine  Member, American College of Lifestyle Medicine  Member, American Association for Physician Leadership  [address], [place] [address]  [phone] (phone); [phone] (fax)  [email or web address]

## 2025-05-29 ENCOUNTER — LAB ENCOUNTER (OUTPATIENT)
Dept: LAB | Age: 75
End: 2025-05-29
Attending: INTERNAL MEDICINE
Payer: MEDICARE

## 2025-05-29 DIAGNOSIS — E87.6 HYPOKALEMIA: ICD-10-CM

## 2025-05-29 LAB
ANION GAP SERPL CALC-SCNC: 9 MMOL/L (ref 0–18)
BUN BLD-MCNC: 17 MG/DL (ref 9–23)
CALCIUM BLD-MCNC: 9.6 MG/DL (ref 8.7–10.6)
CHLORIDE SERPL-SCNC: 100 MMOL/L (ref 98–112)
CO2 SERPL-SCNC: 32 MMOL/L (ref 21–32)
CREAT BLD-MCNC: 0.71 MG/DL (ref 0.55–1.02)
EGFRCR SERPLBLD CKD-EPI 2021: 88 ML/MIN/1.73M2 (ref 60–?)
FASTING STATUS PATIENT QL REPORTED: YES
GLUCOSE BLD-MCNC: 100 MG/DL (ref 70–99)
OSMOLALITY SERPL CALC.SUM OF ELEC: 294 MOSM/KG (ref 275–295)
POTASSIUM SERPL-SCNC: 4 MMOL/L (ref 3.5–5.1)
SODIUM SERPL-SCNC: 141 MMOL/L (ref 136–145)

## 2025-05-29 PROCEDURE — 36415 COLL VENOUS BLD VENIPUNCTURE: CPT

## 2025-05-29 PROCEDURE — 80048 BASIC METABOLIC PNL TOTAL CA: CPT

## 2025-05-30 ENCOUNTER — PATIENT MESSAGE (OUTPATIENT)
Facility: CLINIC | Age: 75
End: 2025-05-30

## 2025-05-30 DIAGNOSIS — E87.6 HYPOKALEMIA: Primary | ICD-10-CM

## 2025-06-02 ENCOUNTER — OFFICE VISIT (OUTPATIENT)
Dept: NEUROLOGY | Facility: CLINIC | Age: 75
End: 2025-06-02
Payer: MEDICARE

## 2025-06-02 VITALS
BODY MASS INDEX: 20 KG/M2 | SYSTOLIC BLOOD PRESSURE: 104 MMHG | HEART RATE: 62 BPM | WEIGHT: 101 LBS | DIASTOLIC BLOOD PRESSURE: 76 MMHG | RESPIRATION RATE: 16 BRPM

## 2025-06-02 DIAGNOSIS — R42 EPISODES OF VERTIGO OCCURRING INFREQUENTLY: Primary | ICD-10-CM

## 2025-06-02 DIAGNOSIS — Z86.73 HISTORY OF TIA (TRANSIENT ISCHEMIC ATTACK): ICD-10-CM

## 2025-06-02 DIAGNOSIS — R93.0 ABNORMAL FINDINGS ON DIAGNOSTIC IMAGING OF SKULL AND HEAD, NOT ELSEWHERE CLASSIFIED: ICD-10-CM

## 2025-06-02 PROCEDURE — 99204 OFFICE O/P NEW MOD 45 MIN: CPT | Performed by: OTHER

## 2025-06-02 NOTE — PATIENT INSTRUCTIONS
Refill policies:     Contact your pharmacy at least 5 days prior to running out of medication and have them send an electronic request or submit request through the “request refill” option in your [employer] account.  Allow 3-5 business days for refills; controlled substances may take longer.  If your prescription is due for a refill, please make sure you have a follow up appointment scheduled with the appropriate prescribing physician.  To best provide you care, patients receiving routine medications need to be seen at least once a year.  Patients receiving narcotic/controlled substance medications need to be seen at least once every 3 months.  Patients receiving narcotic/controlled substance medications will be required to sign an Opioid Treatment Agreement/Contract.  Refills will not be refilled on weekends or holidays; on-call physicians will not refill routine medications.  No narcotics or controlled substances are refilled after noon on Fridays or by on-call physicians.  Federal Law states narcotics must be electronically prescribed.  A 30-day supply with no refills is the maximum allowed by law.  In the event that your preferred pharmacy does not have the requested medication in stock (e.g., Backordered), it is your responsibility to find another pharmacy that has the requested medication available.  We will gladly send a new prescription to that pharmacy at your request.

## 2025-06-02 NOTE — PROGRESS NOTES
[facility]   Neurology    Adilene Garvin Patient Status:  No patient class for patient encounter    1950 MRN [id number]   Location [facility] PCP Lam Hines MD              HPI:   Adilene Garvin is a(n) 74 year old female with history of IBS, gastric erosions, HTN, HL, who presents at the request of Lam Hines MD for evaluation of possible TIA's in the past. One episode was around - consisted of vertigo that lasted about 5 hours. In 2019 had a 5 minute episode of word finding difficulty. Went to the ED. After the episode, had difficulty coming up with medications she was taking. Was started on ASA, but due to GI issues, could not tolerate it, even with PPI's. Switched to Plavix, but also had a lot of stomach pain, and stopped it. Then Covid happened, and did not see a neurologist for about a 1 year. Started ASA every other day, had GI pain, then started ASA every 3 days. Also was taking carafate, then stopped the carafate. In 2025 was in [place], had a UTI. Was sitting, got very dizzy, room was spinning, and started feeling shaky all over. Became tremulous. Has a cardio mobile device, and had tachycardia while nervous. No weakness, no speech changes, no vision changes. No headache. Two days later was sleeping for most of the day, after got back from [place]. Similar episode to one in 2017. Vertigo was continuous. She then went to bed and slept for 5 hours, got up at 1am, and it was gone. In her 30's, she would have severe headaches with nausea, around her period, last one in 40's.     Pertinent imaging and laboratory work-up:   Component      Latest Ref Rng 3/3/2025   HEMOGLOBIN A1C      4.3 - 5.6 % 6.3 !    Cartridge Lot#      Numeric [id number]    Cartridge Expiration Date      Date 10/18/26    HEMOGLOBIN A1c      <5.7 % 6.3 (H)    ESTIMATED AVERAGE GLUCOSE      68 - 126 mg/dL 134 (H)       Legend:  ! Abnormal  (H)  High  MRI/MRA head and neck 2019  CONCLUSION:       1. No acute intracranial abnormality identified.     2. Trace chronic microvascular ischemic changes in the cerebral white matter.     3. No evidence of intracranial aneurysm, flow-limiting stenosis, or focal arterial occlusion.     4. No evidence of occlusion, dissection, or flow-limiting stenosis in the cervical vertebral or carotid arteries. No evidence of hemodynamically significant carotid stenosis by NASCET criteria.    Echo 2019  IMPRESSION:  Normal stress echocardiographic images make significant underlying coronary disease unlikely.    EEG 2019  IMPRESSION:  This is a normal study for patient's age.  There is no epileptiform activity that was identified during this recording.  Clinical correlation is indicated.        Past Medical History:  Past Medical History:    Anxiety state, unspecified    ASTHMA    BPPV (benign paroxysmal positional vertigo)    Cervicalgia    Depression    Esophageal reflux    Fibrocystic disease of breast    GLAUCOMA NOS    High blood pressure    High cholesterol    IBS (irritable bowel syndrome)    Kidney mass    Lipoma    MENOPAUSE    Mixed hyperlipidemia    Osteopenia    Osteoporosis    Palpitations    Pre-diabetes    Proctalgia fugax    Tietze's disease    TMJ disorder    VITAMIN D DEFICIENCY NOS        Past Surgical History:  Past Surgical History:   Procedure Laterality Date    Colonoscopy N/A 8/22/2016    Colonic Polyps (adenomas)Procedure: COLONOSCOPY;  Surgeon: Marc Houser MD;  Location:  ENDOSCOPY    Colonoscopy  07/10/2020    TI wnl, sigmoid polyp    Colonoscopy,biopsy  2005    adenoma    Colonoscopy,diagnostic  1/12/10    wnl, hemorrhoids    D & c  2009    post men bleeding    Egd  07/10/2020    esophageal, gastric and small bowel bx taken    Egd N/A 7/10/2020    Procedure: ESOPHAGOGASTRODUODENOSCOPY, COLONOSCOPY, POSSIBLE BIOPSY, POSSIBLE POLYPECTOMY 64696, 03668;  Surgeon: Marc Houser MD;  Location:  [facility] [place]    G-esoph reflx tst w/electrod  3/19-3/21/13    48 Rob[place]o on Dexilant/Zantac shows 0 reflux in a 48 hour period    Hemorrhoidectomy      Hernia surgery  08/06/2020    Repair incarcerated femoral hernia    Upper gi endoscopy,biopsy  6/5/06    wnl, Gastric and SB Bx wnl    Upper gi endoscopy,biopsy  1993    gastritis, bx for H. pylori negtive    Upper gi endoscopy,biopsy  1/12/10    wnl, Gastric and SB Bx wnl    Upper gi endoscopy,diagnosis  2001    wnl    Upper gi endoscopy,diagnosis  3/19/13    wnl, small hiatal hernia, proximal and distal esophageal bx normal    Upper gi endoscopy,diagnosis  02/01/2018    normal, esophageal, gastric and SB Bx negative       Family History:  family history includes Asthma in her sister; Breast Cancer (age of onset: 55) in her maternal aunt; Breast Cancer (age of onset: 60) in her maternal cousin female; Diabetes in her father and sister; Eye Problems in her mother; Gastro-Intestinal Disorder in her son; Glaucoma in her mother; Heart Disease in her father; Heart Surgery in her father; High Cholesterol in her brother, daughter, father, sister, and son; Hypertension in her father and sister; Kidney Disease in her father; Lipids in her brother and sister; Lung Disorder in her father; Other in her daughter, father, mother, and paternal grandmother; Pacemaker in her father; Prostate Cancer in her father; Pulmonary Disease in her father; Renal Disease in her father.      Social History:   reports that she has never smoked. She has never used smokeless tobacco. She reports that she does not drink alcohol and does not use drugs.    Allergies:  Allergies   Allergen Reactions    Isovue [Isovue-M] OTHER (SEE COMMENTS)     INC. HR AND BP    Desipramine Hcl UNKNOWN    Omeprazole OTHER (SEE COMMENTS)     Stomach spasms - prefers dexilant nexium    Prednisone OTHER (SEE COMMENTS)     Tachycardia        MEDICATIONS:    Current Outpatient Medications:     estradiol 0.1 MG/GM  Vaginal Cream, 3 (three) times a week. APPLY TO URETHRA, Disp: , Rfl:     potassium citrate 10 MEQ (1080 MG) Oral Tab CR, Take 1 tablet (10 mEq total) by mouth in the morning and 1 tablet (10 mEq total) before bedtime. (Patient taking differently: Take 1 tablet (10 mEq total) by mouth daily.), Disp: 60 tablet, Rfl: 1    gabapentin 100 MG Oral Cap, Take 1 capsule (100 mg total) by mouth nightly., Disp: , Rfl:     Calcium Citrate Does not apply Powder, Take 600 mg by mouth every morning. ½ teaspoonful (600 mg total), Disp: , Rfl:     cholecalciferol 50 MCG (2000 UT) Oral Cap, Take 1 capsule (2,000 Units total) by mouth daily., Disp: , Rfl:     cycloSPORINE 0.05 % Ophthalmic Emulsion, Place 1 drop into both eyes 2 (two) times daily., Disp: , Rfl:     Omega-3 Fatty Acids (FISH OIL OR), Take 1 capsule by mouth daily., Disp: , Rfl:     polyethylene glycol, PEG 3350, (MIRALAX) 17 GM/SCOOP Oral Powder, Take 5.7 g by mouth daily as needed (for constipation)., Disp: , Rfl:     simethicone 80 MG Oral Chew Tab, Chew 1-2 tablets ( mg total) by mouth every 6 (six) hours as needed for FLATULENCE., Disp: , Rfl:     Eyelid Cleansers (OCUSOFT LID SCRUB ORIGINAL EX), Apply 1 Pump topically nightly as needed (to both eyelids)., Disp: , Rfl:     atorvastatin 20 MG Oral Tab, Take 1 tablet (20 mg total) by mouth nightly., Disp: 90 tablet, Rfl: 3    hydroCHLOROthiazide 12.5 MG Oral Tab, Take 1 tablet (12.5 mg total) by mouth every morning., Disp: 90 tablet, Rfl: 3    aspirin 81 MG Oral Tab EC, Take 1 tablet (81 mg total) by mouth every 3 (three) days., Disp: , Rfl:     FAMOTIDINE 40 MG Oral Tab, TAKE 1 TABLET BY MOUTH EVERY DAY, Disp: 90 tablet, Rfl: 0    Probiotic Product (PROBIOTIC DAILY OR), Take 3 capsules by mouth daily with lunch., Disp: , Rfl:       Review of Systems:   A comprehensive 10 point review of systems was completed.     Pertinent positives and negatives noted in the HPI.         PHYSICAL EXAM:   Neurological  Exam  Vitals  Vitals:    06/02/25 0956   BP: 104/76   Pulse: 62   Resp: 16     General Appearance: Patient is a 74 year old female in no acute distress  Cardiac: Normal rate & regular rhythm  Skin: There are no rashes or other skin lesions.  Musculoskeletal: There is no scoliosis, or joint deformities  Neurologic examination:  Mental status: Patient is alert, attentive, and oriented x 3. Language is coherent and fluent without aphasia. Memory, comprehension and ability to follow commands were intact.   Cranial nerves II-XII: Optic discs were sharp. Pupils were round and reacted to light. Extraocular movements were full. There was no face, jaw, palate or tongue weakness or atrophy. Facial sensation was normal. Hearing was grossly intact. Shoulder shrug was normal.   Motor exam revealed normal muscle bulk and tone. No atrophy or fasciculations. Manual muscle testing revealed MRC grade 5/5 strength throughout including proximal and distal muscles of the arms and legs.  Deep tendon reflexes were 2 at the biceps, brachioradialis, triceps, knee jerk, and ankle jerk. Plantar responses were flexor bilaterally.   Sensory exam revealed normal light touch perception. Vibratory perception and proprioception were intact at the toes. Pinprick and temperature were normal. Romberg sign was absent.  Complex motor skills revealed normal coordination. Finger-nose-finger intact.   Gait was narrow and stable, was able to walk on heels, toes and tandem without any difficulty.     ASSESSMENT/ACTIVE PROBLEM LIST:     Encounter Diagnoses   Name Primary?    Episodes of vertigo occurring infrequently Yes    History of TIA (transient ischemic attack)     Abnormal findings on diagnostic imaging of skull and head, not elsewhere classified        Discussion/Plan:  Probable TIA in 2019, with transient word finding difficulty  Severe difficulty tolerating antiplatelets  Continue aspirin every 3 days  Obtain MRA neck  Continue lipitor 20mg for LDL <  70  Monitor blood pressure, blood glucose regularly    Two episodes of vertigo, lasting 5 hours, last in 2/2025  Differential includes acephalgic migraine, less likely TIA, given no posterior circulation stenosis, and second episode occurred with tremulousness, and had UTI at the time, and low potassium  Obtain MRI brain  Potential migraine triggers are dehydration, skipping meals, not enough sleep, stress    Neurogenic cough, on gabapentin 400mg when tried lowering to 100mg   Ok to try to wean  From gabapentin 100mg every night, try 100mg every 2 nights x 1 month, then 100mg nightly every 3 nights for 1 month, and so on, until of, if tolerated    Redundant eyelid tissue  Not consistent with ptosis    Requested Prescriptions      No prescriptions requested or ordered in this encounter          We discussed in depth regarding the diagnosis, prognosis, treatment. The patient was given ample opportunity to ask questions. All questions and concerns were addressed.     Lisa Beaver, DO  Neuromuscular and General Neurology  [facility]

## 2025-06-05 ENCOUNTER — DOCUMENTATION ONLY (OUTPATIENT)
Facility: CLINIC | Age: 75
End: 2025-06-05

## 2025-06-05 NOTE — TELEPHONE ENCOUNTER
Please see MCM from patient, pended order for BMP to be completed in July, please advise if any other recommendations/labs are needed. Ty.

## 2025-06-19 ENCOUNTER — PATIENT MESSAGE (OUTPATIENT)
Dept: NEUROLOGY | Facility: CLINIC | Age: 75
End: 2025-06-19

## 2025-07-10 ENCOUNTER — HOSPITAL ENCOUNTER (OUTPATIENT)
Dept: MRI IMAGING | Facility: HOSPITAL | Age: 75
Discharge: HOME | End: 2025-07-10
Attending: Other
Payer: MEDICARE

## 2025-07-10 ENCOUNTER — PATIENT MESSAGE (OUTPATIENT)
Dept: NEUROLOGY | Facility: CLINIC | Age: 75
End: 2025-07-10

## 2025-07-10 DIAGNOSIS — R93.0 ABNORMAL FINDINGS ON DIAGNOSTIC IMAGING OF SKULL AND HEAD, NOT ELSEWHERE CLASSIFIED: ICD-10-CM

## 2025-07-10 DIAGNOSIS — R42 EPISODES OF VERTIGO OCCURRING INFREQUENTLY: ICD-10-CM

## 2025-07-10 DIAGNOSIS — Z86.73 HISTORY OF TIA (TRANSIENT ISCHEMIC ATTACK): ICD-10-CM

## 2025-07-10 PROCEDURE — 70551 MRI BRAIN STEM W/O DYE: CPT | Performed by: OTHER

## 2025-07-10 PROCEDURE — A9575 INJ GADOTERATE MEGLUMI 0.1ML: HCPCS | Performed by: OTHER

## 2025-07-10 PROCEDURE — 70549 MR ANGIOGRAPH NECK W/O&W/DYE: CPT | Performed by: OTHER

## 2025-07-10 RX ORDER — DIPHENHYDRAMINE HYDROCHLORIDE 50 MG/ML
10 INJECTION, SOLUTION INTRAMUSCULAR; INTRAVENOUS
Status: COMPLETED | OUTPATIENT
Start: 2025-07-10 | End: 2025-07-10

## 2025-07-10 RX ADMIN — DIPHENHYDRAMINE HYDROCHLORIDE 9 ML: 50 INJECTION, SOLUTION INTRAMUSCULAR; INTRAVENOUS at 11:50:00

## 2025-07-18 ENCOUNTER — LAB ENCOUNTER (OUTPATIENT)
Dept: LAB | Age: 75
End: 2025-07-18
Attending: INTERNAL MEDICINE
Payer: MEDICARE

## 2025-07-18 DIAGNOSIS — E87.6 HYPOKALEMIA: ICD-10-CM

## 2025-07-18 LAB
ANION GAP SERPL CALC-SCNC: 7 MMOL/L (ref 0–18)
BUN BLD-MCNC: 17 MG/DL (ref 9–23)
CALCIUM BLD-MCNC: 8.9 MG/DL (ref 8.7–10.6)
CHLORIDE SERPL-SCNC: 101 MMOL/L (ref 98–112)
CO2 SERPL-SCNC: 31 MMOL/L (ref 21–32)
CREAT BLD-MCNC: 0.77 MG/DL (ref 0.55–1.02)
EGFRCR SERPLBLD CKD-EPI 2021: 80 ML/MIN/1.73M2 (ref 60–?)
FASTING STATUS PATIENT QL REPORTED: YES
GLUCOSE BLD-MCNC: 97 MG/DL (ref 70–99)
OSMOLALITY SERPL CALC.SUM OF ELEC: 289 MOSM/KG (ref 275–295)
POTASSIUM SERPL-SCNC: 3.8 MMOL/L (ref 3.5–5.1)
SODIUM SERPL-SCNC: 139 MMOL/L (ref 136–145)

## 2025-07-18 PROCEDURE — 80048 BASIC METABOLIC PNL TOTAL CA: CPT

## 2025-07-18 PROCEDURE — 36415 COLL VENOUS BLD VENIPUNCTURE: CPT

## 2025-07-21 ENCOUNTER — APPOINTMENT (OUTPATIENT)
Dept: ADMINISTRATIVE | Facility: HOSPITAL | Age: 75
End: 2025-07-21
Payer: MEDICARE

## 2025-08-01 ENCOUNTER — ANESTHESIA (OUTPATIENT)
Dept: ENDOSCOPY | Facility: HOSPITAL | Age: 75
End: 2025-08-01

## 2025-08-01 ENCOUNTER — HOSPITAL ENCOUNTER (OUTPATIENT)
Facility: HOSPITAL | Age: 75
Setting detail: HOSPITAL OUTPATIENT SURGERY
Discharge: HOME | End: 2025-08-01
Attending: INTERNAL MEDICINE | Admitting: INTERNAL MEDICINE

## 2025-08-01 ENCOUNTER — ANESTHESIA EVENT (OUTPATIENT)
Dept: ENDOSCOPY | Facility: HOSPITAL | Age: 75
End: 2025-08-01

## 2025-08-01 VITALS
TEMPERATURE: 97 F | HEIGHT: 59 IN | OXYGEN SATURATION: 99 % | HEART RATE: 85 BPM | RESPIRATION RATE: 16 BRPM | WEIGHT: 97 LBS | BODY MASS INDEX: 19.56 KG/M2 | SYSTOLIC BLOOD PRESSURE: 116 MMHG | DIASTOLIC BLOOD PRESSURE: 72 MMHG

## 2025-08-01 DIAGNOSIS — K59.00 CONSTIPATION, UNSPECIFIED CONSTIPATION TYPE: ICD-10-CM

## 2025-08-01 DIAGNOSIS — R14.0 BLOATING: ICD-10-CM

## 2025-08-01 DIAGNOSIS — K21.9 GASTROESOPHAGEAL REFLUX DISEASE, UNSPECIFIED WHETHER ESOPHAGITIS PRESENT: ICD-10-CM

## 2025-08-01 DIAGNOSIS — Z86.0100 HISTORY OF COLON POLYPS: ICD-10-CM

## 2025-08-01 DIAGNOSIS — R11.0 NAUSEA: ICD-10-CM

## 2025-08-01 PROCEDURE — 88305 TISSUE EXAM BY PATHOLOGIST: CPT | Performed by: INTERNAL MEDICINE

## 2025-08-01 RX ORDER — ESOMEPRAZOLE MAGNESIUM 40 MG/1
40 CAPSULE, DELAYED RELEASE ORAL
Qty: 60 CAPSULE | Refills: 0 | Status: SHIPPED | OUTPATIENT
Start: 2025-08-01 | End: 2025-09-30

## 2025-08-01 RX ORDER — HYDROMORPHONE HYDROCHLORIDE 1 MG/ML
0.4 INJECTION, SOLUTION INTRAMUSCULAR; INTRAVENOUS; SUBCUTANEOUS EVERY 5 MIN PRN
Status: DISCONTINUED | OUTPATIENT
Start: 2025-08-01 | End: 2025-08-01

## 2025-08-01 RX ORDER — NALOXONE HYDROCHLORIDE 0.4 MG/ML
0.08 INJECTION, SOLUTION INTRAMUSCULAR; INTRAVENOUS; SUBCUTANEOUS AS NEEDED
Status: DISCONTINUED | OUTPATIENT
Start: 2025-08-01 | End: 2025-08-01

## 2025-08-01 RX ORDER — SODIUM CHLORIDE, SODIUM LACTATE, POTASSIUM CHLORIDE, CALCIUM CHLORIDE 600; 310; 30; 20 MG/100ML; MG/100ML; MG/100ML; MG/100ML
INJECTION, SOLUTION INTRAVENOUS CONTINUOUS
Status: DISCONTINUED | OUTPATIENT
Start: 2025-08-01 | End: 2025-08-01

## 2025-08-01 RX ORDER — NICOTINE POLACRILEX 4 MG
30 LOZENGE BUCCAL
Status: DISCONTINUED | OUTPATIENT
Start: 2025-08-01 | End: 2025-08-01

## 2025-08-01 RX ORDER — ONDANSETRON 2 MG/ML
4 INJECTION INTRAMUSCULAR; INTRAVENOUS EVERY 6 HOURS PRN
Status: DISCONTINUED | OUTPATIENT
Start: 2025-08-01 | End: 2025-08-01

## 2025-08-01 RX ORDER — HYDROMORPHONE HYDROCHLORIDE 1 MG/ML
0.2 INJECTION, SOLUTION INTRAMUSCULAR; INTRAVENOUS; SUBCUTANEOUS EVERY 5 MIN PRN
Status: DISCONTINUED | OUTPATIENT
Start: 2025-08-01 | End: 2025-08-01

## 2025-08-01 RX ORDER — METOCLOPRAMIDE HYDROCHLORIDE 5 MG/ML
10 INJECTION INTRAMUSCULAR; INTRAVENOUS EVERY 8 HOURS PRN
Status: DISCONTINUED | OUTPATIENT
Start: 2025-08-01 | End: 2025-08-01

## 2025-08-01 RX ORDER — LIDOCAINE HYDROCHLORIDE 10 MG/ML
INJECTION, SOLUTION EPIDURAL; INFILTRATION; INTRACAUDAL; PERINEURAL AS NEEDED
Status: DISCONTINUED | OUTPATIENT
Start: 2025-08-01 | End: 2025-08-01 | Stop reason: SURG

## 2025-08-01 RX ORDER — NICOTINE POLACRILEX 4 MG
15 LOZENGE BUCCAL
Status: DISCONTINUED | OUTPATIENT
Start: 2025-08-01 | End: 2025-08-01

## 2025-08-01 RX ORDER — DEXTROSE MONOHYDRATE 25 G/50ML
50 INJECTION, SOLUTION INTRAVENOUS
Status: DISCONTINUED | OUTPATIENT
Start: 2025-08-01 | End: 2025-08-01

## 2025-08-01 RX ORDER — HYDROMORPHONE HYDROCHLORIDE 1 MG/ML
0.6 INJECTION, SOLUTION INTRAMUSCULAR; INTRAVENOUS; SUBCUTANEOUS EVERY 5 MIN PRN
Status: DISCONTINUED | OUTPATIENT
Start: 2025-08-01 | End: 2025-08-01

## 2025-08-01 RX ADMIN — SODIUM CHLORIDE, SODIUM LACTATE, POTASSIUM CHLORIDE, CALCIUM CHLORIDE: 600; 310; 30; 20 INJECTION, SOLUTION INTRAVENOUS at 08:24:00

## 2025-08-01 RX ADMIN — SODIUM CHLORIDE, SODIUM LACTATE, POTASSIUM CHLORIDE, CALCIUM CHLORIDE: 600; 310; 30; 20 INJECTION, SOLUTION INTRAVENOUS at 07:49:00

## 2025-08-01 RX ADMIN — LIDOCAINE HYDROCHLORIDE 25 MG: 10 INJECTION, SOLUTION EPIDURAL; INFILTRATION; INTRACAUDAL; PERINEURAL at 07:47:00

## 2025-08-04 ENCOUNTER — MED REC SCAN ONLY (OUTPATIENT)
Facility: CLINIC | Age: 75
End: 2025-08-04

## 2025-08-05 ENCOUNTER — TELEPHONE (OUTPATIENT)
Facility: CLINIC | Age: 75
End: 2025-08-05

## 2025-08-07 ENCOUNTER — TELEPHONE (OUTPATIENT)
Facility: CLINIC | Age: 75
End: 2025-08-07

## 2025-08-07 ENCOUNTER — OFFICE VISIT (OUTPATIENT)
Facility: CLINIC | Age: 75
End: 2025-08-07

## 2025-08-07 VITALS
SYSTOLIC BLOOD PRESSURE: 106 MMHG | TEMPERATURE: 98 F | OXYGEN SATURATION: 97 % | WEIGHT: 98 LBS | BODY MASS INDEX: 19.76 KG/M2 | HEIGHT: 59 IN | DIASTOLIC BLOOD PRESSURE: 60 MMHG | HEART RATE: 70 BPM

## 2025-08-07 DIAGNOSIS — S99.921A INJURY OF RIGHT FOOT, INITIAL ENCOUNTER: Primary | ICD-10-CM

## 2025-08-07 DIAGNOSIS — R73.03 BORDERLINE DIABETES: Primary | ICD-10-CM

## 2025-08-07 PROCEDURE — 99214 OFFICE O/P EST MOD 30 MIN: CPT | Performed by: INTERNAL MEDICINE

## 2025-08-18 ENCOUNTER — HOSPITAL ENCOUNTER (OUTPATIENT)
Dept: GENERAL RADIOLOGY | Facility: HOSPITAL | Age: 75
Discharge: HOME | End: 2025-08-18
Attending: INTERNAL MEDICINE

## 2025-08-18 DIAGNOSIS — S99.921A INJURY OF RIGHT FOOT, INITIAL ENCOUNTER: ICD-10-CM

## 2025-08-18 PROCEDURE — 73630 X-RAY EXAM OF FOOT: CPT | Performed by: INTERNAL MEDICINE

## (undated) DEVICE — CANISTER SUCT 1200CC LID BLU HRD ADPT AUTO

## (undated) DEVICE — ELECTRODE EKG W3.5XL4CM ABRAD W1.25XL1.5IN

## (undated) DEVICE — KIT VLV 5 PC AIR H2O SUCT BX ENDOGATOR CONN

## (undated) DEVICE — CANNULA NSL AD W/ FLTR 14FT O2/CO2

## (undated) DEVICE — KIT CUSTOM ENDOPROCEDURE STERIS

## (undated) DEVICE — BITEBLOCK ENDOSCP 60FR MAXI STRP

## (undated) DEVICE — Device

## (undated) DEVICE — KIT MFLD FOR SPEC COLL

## (undated) NOTE — Clinical Note
1. Patient is interested in Pharmacogenomic Testing thru [facility] [place].  Please facilitate. 2. She is interested in polygenic risk score testing.  Please facilitate.  Dr. MADRIGAL

## (undated) NOTE — Clinical Note
Stephan Dean.  The patient Adilene just transitioned into my [facility] practice thru [place].  We spoke about her ongoing functional GI disorder.  I told her that I'd reach out to you.  She will keep her virtual appointment with you on 2/26.  In the meantime, I'm exploring adjunctive allied health approaches that may complement what's already been done.    Kind regards, Lam

## (undated) NOTE — ED AVS SNAPSHOT
Judith Garvin   MRN: [id number]    Department:  [facility] Emergency Department   Date of Visit:  1/11/2018           Disclosure     Insurance plans vary and the physician(s) referred by the ER may not be covered by your plan.  Please contact your tell this physician (or your personal doctor if your instructions are to return to your personal doctor) about any new or lasting problems. The primary care or specialist physician will see patients referred from the [facility] Emergency Department.  Cheryle Levins

## (undated) NOTE — LETTER
ASTHMA ACTION PLAN for Carmen Hanks     :      Date: 2020  Provider:  José Luis Otero MD  Phone for doctor or clinic: [facility] [address], [address], [id number] [place] [address]  [phone]

## (undated) NOTE — LETTER
Adilene Garvin  [id number] [facility] [id number]   1950    Order for Pharmacogenomics Panel   Dx Z79.899, F32.9  Dr. Donny JACKSONI [phone]

## (undated) NOTE — Clinical Note
Stephan Holloway.  I just wanted to let you know that your long time patient Adilene transitioned to  Medicine with me.  I told her that I know you when she recently came in for a \"meet and greet\" session.  After this introductory visit and deep thought on her end, she called us several days later and wanted to move forward with the contract.  She had nothing but amazing things to say about you!!!  She is incredibly thankful for all of the wonderful care you've given her over the years.  She believes that  is right for her at this time in her life given her age, chronic diseases, and other personalized factors that made her seek out the  med in the first place.  I told her that I would reach out to you out of common courtesy.  Additionally, she says that she would write you a handwritten letter and personally deliver it to your office.  She's old school.  Kind regards, Lam

## (undated) NOTE — Clinical Note
Please follow up with [facility] on referral protocol for functional GI disorder and GI food sensitivity.  I'd like to send her to this group for evaluation.  Dr. MADRIGAL

## (undated) NOTE — Clinical Note
Stephan Honeycutt.  Just wanted to let you know that I have assumed primary care for this patient as she chose to transition into the  medicine model.  Thank you.  Lam

## (undated) NOTE — Clinical Note
Please call over to [facility].  This patient has intermittent episodes of tachycardia, possibly related to underlying SVT.  I would like her to be seen by Dr. Qureshi.  How is his availability?  Also, if there are other electrophysiologist with sooner availability, then the patient is open to that option as well.  Dr. MADRIGAL

## (undated) NOTE — MR AVS SNAPSHOT
After Visit Summary   2/27/2020    Vanesa Garvin    MRN: [id number]           Visit Information     Date & Time  2/27/2020  1:30 PM Provider  Lenny Stephens MD Department  [address] [place] [address] Dept.  Phone  [phone]      Allergies as of 2 4/15/2020 9:00 AM [facility]    4/20/2020 8:30 AM Pascual Valentino, ENT - [address]    7/9/2020 8:30 AM Basilia Power [facility] INTERNAL MEDICINE - [facility], [address] [place] [address]    7/28/2020 2:40 Primary Care Providers  Treatment for acute illness   or injury that are   non-life-threatening.   Also available by appointment     Average cost  $70*       [facility] – [address] [place] [address]

## (undated) NOTE — ED AVS SNAPSHOT
Debbiesatnam Henley Virgie   MRN: [id number]    Department:  [facility] Emergency Department   Date of Visit:  11/30/2019           Disclosure     Insurance plans vary and the physician(s) referred by the ER may not be covered by your plan.  Please contact your tell this physician (or your personal doctor if your instructions are to return to your personal doctor) about any new or lasting problems. The primary care or specialist physician will see patients referred from the [facility] Emergency Department.  Ulisses Hoyos

## (undated) NOTE — Clinical Note
Stephan Rosen.  This is a new patient who will be seeing you tomorrow for tachycardia, which I think is SVT.   Lam

## (undated) NOTE — LETTER
ASTHMA ACTION PLAN for Adilene Garvin     : 1950     Date: 2025  Provider:  Lam Hines MD  Phone for doctor or clinic: [facility], [address] YEPEZ [address] [place] [id number] [phone]    ACT Score: 23      You can use the colors of a traffic light to help learn about your asthma medicines.      1. Green - Go! % of Personal Best Peak Flow Use controller medicine.   Breathing is good  No cough or wheeze  Can work and play Medicine How much to take When to take it    N/a      2. Yellow - Caution. 50-79% Personal Best Peak  Flow.  Use reliever medicine to keep an asthma attack from getting bad.   Cough  Wheezing  Tight Chest  Wake up at night Medicine How much to take When to take it    N/a       Additional instructions         3. Red - Stop! Danger!  <50% Personal Best Peak  Flow. Take these medications until  Get help from a doctor   Medicine not helping  Breathing is hard and fast  Nose opens wide  Can't walk  Ribs show  Can't talk well Medicine How much to take When to take it    Go to the ER      Additional Instructions If your symptoms do not improve and you cannot contact your doctor, [facility] or call [facility] immediately!     [] Asthma Action Plan reviewed with patient (and caregiver if necessary) and a copy of the plan was given to the patient/caregiver.   [x] Asthma Action Plan reviewed with patient (and caregiver if necessary) on the phone and mailed copy to patient or submitted via [employer].     Signatures:Dr. Hines   Provider  Lam Hines MD   Patient Caretaker